# Patient Record
Sex: MALE | Race: WHITE | NOT HISPANIC OR LATINO | Employment: OTHER | URBAN - METROPOLITAN AREA
[De-identification: names, ages, dates, MRNs, and addresses within clinical notes are randomized per-mention and may not be internally consistent; named-entity substitution may affect disease eponyms.]

---

## 2023-02-09 PROBLEM — K21.9 GERD (GASTROESOPHAGEAL REFLUX DISEASE): Status: ACTIVE | Noted: 2023-02-09

## 2023-02-09 PROBLEM — M54.2 NECK PAIN: Status: ACTIVE | Noted: 2023-02-09

## 2023-02-09 PROBLEM — E83.10 DISORDER OF IRON METABOLISM: Status: ACTIVE | Noted: 2023-02-09

## 2023-02-09 PROBLEM — Z95.1 HISTORY OF CORONARY ARTERY BYPASS SURGERY: Status: ACTIVE | Noted: 2023-02-09

## 2023-02-09 PROBLEM — M48.07 LUMBOSACRAL SPINAL STENOSIS: Status: ACTIVE | Noted: 2023-02-09

## 2023-02-09 PROBLEM — R05.3 PERSISTENT COUGH: Status: ACTIVE | Noted: 2023-02-09

## 2023-02-09 PROBLEM — M51.369 LUMBAR DEGENERATIVE DISC DISEASE: Status: ACTIVE | Noted: 2023-02-09

## 2023-02-09 PROBLEM — F52.21 ERECTILE DISORDER, ACQUIRED, GENERALIZED, SEVERE: Status: ACTIVE | Noted: 2023-02-09

## 2023-02-09 PROBLEM — M65.30 TRIGGER FINGER: Status: ACTIVE | Noted: 2023-02-09

## 2023-02-09 PROBLEM — G25.81 RLS (RESTLESS LEGS SYNDROME): Status: ACTIVE | Noted: 2023-02-09

## 2023-02-09 PROBLEM — Z86.711 HISTORY OF PULMONARY EMBOLUS (PE): Status: ACTIVE | Noted: 2023-02-09

## 2023-02-09 PROBLEM — R20.2 PARESTHESIA OF ARM: Status: ACTIVE | Noted: 2023-02-09

## 2023-02-09 PROBLEM — E66.9 OBESITY (BMI 30-39.9): Status: ACTIVE | Noted: 2023-02-09

## 2023-02-09 PROBLEM — G47.36 SLEEP RELATED HYPOVENTILATION IN CONDITIONS CLASSIFIED ELSEWHERE: Status: ACTIVE | Noted: 2023-02-09

## 2023-02-09 PROBLEM — F32.0 MDD (MAJOR DEPRESSIVE DISORDER), SINGLE EPISODE, MILD (CMS-HCC): Status: ACTIVE | Noted: 2023-02-09

## 2023-02-09 PROBLEM — E78.5 HYPERLIPIDEMIA: Status: ACTIVE | Noted: 2023-02-09

## 2023-02-09 PROBLEM — M79.674 PAIN OF TOE OF RIGHT FOOT: Status: ACTIVE | Noted: 2023-02-09

## 2023-02-09 PROBLEM — M47.812 CERVICAL SPONDYLOSIS WITHOUT MYELOPATHY: Status: ACTIVE | Noted: 2023-02-09

## 2023-02-09 PROBLEM — I48.91 POSTOPERATIVE ATRIAL FIBRILLATION (MULTI): Status: ACTIVE | Noted: 2023-02-09

## 2023-02-09 PROBLEM — S76.319A: Status: ACTIVE | Noted: 2023-02-09

## 2023-02-09 PROBLEM — M54.17 LUMBOSACRAL NEURITIS: Status: ACTIVE | Noted: 2023-02-09

## 2023-02-09 PROBLEM — K43.9 VENTRAL HERNIA: Status: ACTIVE | Noted: 2023-02-09

## 2023-02-09 PROBLEM — U07.1 COVID-19: Status: ACTIVE | Noted: 2023-02-09

## 2023-02-09 PROBLEM — M54.50 LOW BACK PAIN: Status: ACTIVE | Noted: 2023-02-09

## 2023-02-09 PROBLEM — E04.1 THYROID NODULE: Status: ACTIVE | Noted: 2023-02-09

## 2023-02-09 PROBLEM — R07.9 EXERTIONAL CHEST PAIN: Status: ACTIVE | Noted: 2023-02-09

## 2023-02-09 PROBLEM — M51.36 LUMBAR DEGENERATIVE DISC DISEASE: Status: ACTIVE | Noted: 2023-02-09

## 2023-02-09 PROBLEM — M19.032: Status: ACTIVE | Noted: 2023-02-09

## 2023-02-09 PROBLEM — R21 RASH: Status: ACTIVE | Noted: 2023-02-09

## 2023-02-09 PROBLEM — M51.27 HERNIATED NUCLEUS PULPOSUS, L5-S1, RIGHT: Status: ACTIVE | Noted: 2023-02-09

## 2023-02-09 PROBLEM — M54.16 LEFT LUMBAR RADICULOPATHY: Status: ACTIVE | Noted: 2023-02-09

## 2023-02-09 PROBLEM — I26.99 PULMONARY EMBOLUS (MULTI): Status: ACTIVE | Noted: 2023-02-09

## 2023-02-09 PROBLEM — E55.9 VITAMIN D DEFICIENCY, UNSPECIFIED: Status: ACTIVE | Noted: 2023-02-09

## 2023-02-09 PROBLEM — J40 BRONCHITIS: Status: ACTIVE | Noted: 2023-02-09

## 2023-02-09 PROBLEM — C91.40 HAIRY CELL LEUKEMIA (MULTI): Status: ACTIVE | Noted: 2023-02-09

## 2023-02-09 PROBLEM — H10.029 PINK EYE: Status: ACTIVE | Noted: 2023-02-09

## 2023-02-09 PROBLEM — M20.5X1 CONTRACTURE OF TOE OF RIGHT FOOT: Status: ACTIVE | Noted: 2023-02-09

## 2023-02-09 PROBLEM — I10 ESSENTIAL HYPERTENSION: Status: ACTIVE | Noted: 2023-02-09

## 2023-02-09 PROBLEM — E04.1 UNINODULAR GOITER (NONTOXIC): Status: ACTIVE | Noted: 2023-02-09

## 2023-02-09 PROBLEM — I97.89 POSTOPERATIVE ATRIAL FIBRILLATION (MULTI): Status: ACTIVE | Noted: 2023-02-09

## 2023-02-09 PROBLEM — R19.5 LOOSE STOOLS: Status: ACTIVE | Noted: 2023-02-09

## 2023-02-09 PROBLEM — T16.9XXA FOREIGN BODY IN EAR: Status: ACTIVE | Noted: 2023-02-09

## 2023-02-09 PROBLEM — J02.9 SORE THROAT: Status: ACTIVE | Noted: 2023-02-09

## 2023-02-09 PROBLEM — H90.3 BILATERAL SENSORINEURAL HEARING LOSS: Status: ACTIVE | Noted: 2023-02-09

## 2023-02-09 PROBLEM — R06.09 EXERTIONAL DYSPNEA: Status: ACTIVE | Noted: 2023-02-09

## 2023-02-09 PROBLEM — A49.1 STREPTOCOCCOSIS: Status: ACTIVE | Noted: 2023-02-09

## 2023-02-09 PROBLEM — R53.83 FATIGUE: Status: ACTIVE | Noted: 2023-02-09

## 2023-02-09 PROBLEM — F32.A DEPRESSED: Status: ACTIVE | Noted: 2023-02-09

## 2023-02-09 PROBLEM — E66.811 CLASS 1 OBESITY WITH BODY MASS INDEX (BMI) OF 34.0 TO 34.9 IN ADULT: Status: ACTIVE | Noted: 2023-02-09

## 2023-02-09 PROBLEM — R23.8 SKIN IRRITATION: Status: ACTIVE | Noted: 2023-02-09

## 2023-02-09 PROBLEM — G56.00 CARPAL TUNNEL SYNDROME: Status: ACTIVE | Noted: 2023-02-09

## 2023-02-09 PROBLEM — R63.5 WEIGHT GAIN: Status: ACTIVE | Noted: 2023-02-09

## 2023-02-09 PROBLEM — G47.33 OBSTRUCTIVE SLEEP APNEA, ADULT: Status: ACTIVE | Noted: 2023-02-09

## 2023-02-09 PROBLEM — H65.111 ACUTE ALLERGIC OTITIS MEDIA OF RIGHT EAR: Status: ACTIVE | Noted: 2023-02-09

## 2023-02-09 PROBLEM — R91.1 LUNG NODULE: Status: ACTIVE | Noted: 2023-02-09

## 2023-02-09 PROBLEM — J90 PLEURAL EFFUSION: Status: ACTIVE | Noted: 2023-02-09

## 2023-02-09 PROBLEM — R60.0 BILATERAL LOWER EXTREMITY EDEMA: Status: ACTIVE | Noted: 2023-02-09

## 2023-03-23 ENCOUNTER — OFFICE VISIT (OUTPATIENT)
Dept: PRIMARY CARE | Facility: CLINIC | Age: 78
End: 2023-03-23
Payer: MEDICARE

## 2023-03-23 ENCOUNTER — HOSPITAL ENCOUNTER (OUTPATIENT)
Dept: DATA CONVERSION | Facility: HOSPITAL | Age: 78
End: 2023-03-23
Attending: ANESTHESIOLOGY | Admitting: ANESTHESIOLOGY

## 2023-03-23 ENCOUNTER — LAB (OUTPATIENT)
Dept: LAB | Facility: LAB | Age: 78
End: 2023-03-23
Payer: MEDICARE

## 2023-03-23 VITALS
SYSTOLIC BLOOD PRESSURE: 106 MMHG | BODY MASS INDEX: 33.95 KG/M2 | WEIGHT: 220 LBS | OXYGEN SATURATION: 93 % | DIASTOLIC BLOOD PRESSURE: 67 MMHG | HEART RATE: 82 BPM

## 2023-03-23 DIAGNOSIS — E78.5 HYPERLIPIDEMIA, UNSPECIFIED HYPERLIPIDEMIA TYPE: Primary | ICD-10-CM

## 2023-03-23 DIAGNOSIS — M51.16 INTERVERTEBRAL DISC DISORDERS WITH RADICULOPATHY, LUMBAR REGION: ICD-10-CM

## 2023-03-23 DIAGNOSIS — E78.5 HYPERLIPIDEMIA, UNSPECIFIED HYPERLIPIDEMIA TYPE: ICD-10-CM

## 2023-03-23 LAB
ALANINE AMINOTRANSFERASE (SGPT) (U/L) IN SER/PLAS: 26 U/L (ref 10–52)
ALBUMIN (G/DL) IN SER/PLAS: 4.4 G/DL (ref 3.4–5)
ALKALINE PHOSPHATASE (U/L) IN SER/PLAS: 72 U/L (ref 33–136)
ANION GAP IN SER/PLAS: 11 MMOL/L (ref 10–20)
ASPARTATE AMINOTRANSFERASE (SGOT) (U/L) IN SER/PLAS: 20 U/L (ref 9–39)
BILIRUBIN TOTAL (MG/DL) IN SER/PLAS: 0.7 MG/DL (ref 0–1.2)
CALCIUM (MG/DL) IN SER/PLAS: 9.1 MG/DL (ref 8.6–10.3)
CARBON DIOXIDE, TOTAL (MMOL/L) IN SER/PLAS: 28 MMOL/L (ref 21–32)
CHLORIDE (MMOL/L) IN SER/PLAS: 103 MMOL/L (ref 98–107)
CHOLESTEROL (MG/DL) IN SER/PLAS: 62 MG/DL (ref 0–199)
CHOLESTEROL IN HDL (MG/DL) IN SER/PLAS: 41.3 MG/DL
CHOLESTEROL/HDL RATIO: 1.5
CREATININE (MG/DL) IN SER/PLAS: 0.99 MG/DL (ref 0.5–1.3)
ERYTHROCYTE DISTRIBUTION WIDTH (RATIO) BY AUTOMATED COUNT: 13.6 % (ref 11.5–14.5)
ERYTHROCYTE MEAN CORPUSCULAR HEMOGLOBIN CONCENTRATION (G/DL) BY AUTOMATED: 32.9 G/DL (ref 32–36)
ERYTHROCYTE MEAN CORPUSCULAR VOLUME (FL) BY AUTOMATED COUNT: 102 FL (ref 80–100)
ERYTHROCYTES (10*6/UL) IN BLOOD BY AUTOMATED COUNT: 4.64 X10E12/L (ref 4.5–5.9)
GFR MALE: 78 ML/MIN/1.73M2
GLUCOSE (MG/DL) IN SER/PLAS: 102 MG/DL (ref 74–99)
HEMATOCRIT (%) IN BLOOD BY AUTOMATED COUNT: 47.1 % (ref 41–52)
HEMOGLOBIN (G/DL) IN BLOOD: 15.5 G/DL (ref 13.5–17.5)
LDL: 4 MG/DL (ref 0–99)
LEUKOCYTES (10*3/UL) IN BLOOD BY AUTOMATED COUNT: 5.4 X10E9/L (ref 4.4–11.3)
PLATELETS (10*3/UL) IN BLOOD AUTOMATED COUNT: 171 X10E9/L (ref 150–450)
POTASSIUM (MMOL/L) IN SER/PLAS: 4.5 MMOL/L (ref 3.5–5.3)
PROTEIN TOTAL: 6.6 G/DL (ref 6.4–8.2)
SODIUM (MMOL/L) IN SER/PLAS: 137 MMOL/L (ref 136–145)
TRIGLYCERIDE (MG/DL) IN SER/PLAS: 84 MG/DL (ref 0–149)
UREA NITROGEN (MG/DL) IN SER/PLAS: 28 MG/DL (ref 6–23)
VLDL: 17 MG/DL (ref 0–40)

## 2023-03-23 PROCEDURE — 80061 LIPID PANEL: CPT

## 2023-03-23 PROCEDURE — 3078F DIAST BP <80 MM HG: CPT | Performed by: INTERNAL MEDICINE

## 2023-03-23 PROCEDURE — 36415 COLL VENOUS BLD VENIPUNCTURE: CPT

## 2023-03-23 PROCEDURE — 1160F RVW MEDS BY RX/DR IN RCRD: CPT | Performed by: INTERNAL MEDICINE

## 2023-03-23 PROCEDURE — 1159F MED LIST DOCD IN RCRD: CPT | Performed by: INTERNAL MEDICINE

## 2023-03-23 PROCEDURE — 3074F SYST BP LT 130 MM HG: CPT | Performed by: INTERNAL MEDICINE

## 2023-03-23 PROCEDURE — 85027 COMPLETE CBC AUTOMATED: CPT

## 2023-03-23 PROCEDURE — 80053 COMPREHEN METABOLIC PANEL: CPT

## 2023-03-23 PROCEDURE — 99214 OFFICE O/P EST MOD 30 MIN: CPT | Performed by: INTERNAL MEDICINE

## 2023-03-23 ASSESSMENT — PATIENT HEALTH QUESTIONNAIRE - PHQ9
1. LITTLE INTEREST OR PLEASURE IN DOING THINGS: NOT AT ALL
2. FEELING DOWN, DEPRESSED OR HOPELESS: NOT AT ALL
SUM OF ALL RESPONSES TO PHQ9 QUESTIONS 1 AND 2: 0

## 2023-03-23 NOTE — PROGRESS NOTES
Subjective   Patient ID: Delano Aguilar is a 77 y.o. male who presents for Follow-up.    HPI   Overall doing well   No CP. Breathing is stable, no LE edema   Reports compliance with meds  Mood is stable       Review of Systems   All other systems reviewed and are negative.      Objective   /67   Pulse 82   Wt 99.8 kg (220 lb)   SpO2 93%   BMI 33.95 kg/m²     Physical Exam  Constitutional:       Appearance: Normal appearance.   HENT:      Head: Normocephalic and atraumatic.   Cardiovascular:      Rate and Rhythm: Normal rate and regular rhythm.      Heart sounds: Normal heart sounds. No murmur heard.     No gallop.   Pulmonary:      Effort: Pulmonary effort is normal. No respiratory distress.      Breath sounds: No wheezing or rales.   Abdominal:      General: Abdomen is flat. Bowel sounds are normal.      Palpations: Abdomen is soft.      Tenderness: There is no abdominal tenderness.   Skin:     General: Skin is warm and dry.      Findings: No erythema.   Neurological:      General: No focal deficit present.      Mental Status: He is alert and oriented to person, place, and time. Mental status is at baseline.   Psychiatric:         Mood and Affect: Mood normal.         Behavior: Behavior normal.         Assessment/Plan       #Depression   -Improved. Cont escitalopram 5mg daily and bupropion XL 300mg daily      #Lumbar radiculopathy, neck pain   -Follows with pain management.       #CAD s/p CABG, HLD  -CABG x3 11/2019 2/2 NSTEMI. Follows with cardiology. Continue ASA ,statin, Zetia, Praluent-per cardiology, goal <55     #HTN  -Continue losartan 50mg daily and metoprolol ER 25mg daily, goal <130/80     #TANO  -Reports compliance with CPAP     Colonoscopy: 3/2022  PSA: 6/15/20     RTC 6 mo

## 2023-03-23 NOTE — PATIENT INSTRUCTIONS
QUIT SMOKING CIGARS    GET FASTING BLOOD WORK-FAST FOR 10 HRS BLACK COFFEE AND WATER ARE OK     COME BACK IN 6 MONTHS

## 2023-04-19 ENCOUNTER — OFFICE VISIT (OUTPATIENT)
Dept: PRIMARY CARE | Facility: CLINIC | Age: 78
End: 2023-04-19
Payer: MEDICARE

## 2023-04-19 VITALS
OXYGEN SATURATION: 95 % | BODY MASS INDEX: 34.1 KG/M2 | DIASTOLIC BLOOD PRESSURE: 62 MMHG | HEART RATE: 81 BPM | SYSTOLIC BLOOD PRESSURE: 110 MMHG | WEIGHT: 221 LBS

## 2023-04-19 DIAGNOSIS — F32.0 MAJOR DEPRESSIVE DISORDER, SINGLE EPISODE, MILD (CMS-HCC): ICD-10-CM

## 2023-04-19 DIAGNOSIS — E66.09 CLASS 1 OBESITY DUE TO EXCESS CALORIES WITHOUT SERIOUS COMORBIDITY WITH BODY MASS INDEX (BMI) OF 34.0 TO 34.9 IN ADULT: Primary | ICD-10-CM

## 2023-04-19 PROCEDURE — 1160F RVW MEDS BY RX/DR IN RCRD: CPT | Performed by: INTERNAL MEDICINE

## 2023-04-19 PROCEDURE — 1159F MED LIST DOCD IN RCRD: CPT | Performed by: INTERNAL MEDICINE

## 2023-04-19 PROCEDURE — 3074F SYST BP LT 130 MM HG: CPT | Performed by: INTERNAL MEDICINE

## 2023-04-19 PROCEDURE — 99213 OFFICE O/P EST LOW 20 MIN: CPT | Performed by: INTERNAL MEDICINE

## 2023-04-19 PROCEDURE — 3078F DIAST BP <80 MM HG: CPT | Performed by: INTERNAL MEDICINE

## 2023-04-19 RX ORDER — METFORMIN HYDROCHLORIDE 500 MG/1
500 TABLET ORAL
Qty: 60 TABLET | Refills: 0 | Status: SHIPPED | OUTPATIENT
Start: 2023-04-19 | End: 2023-08-02 | Stop reason: ALTCHOICE

## 2023-04-19 RX ORDER — METFORMIN HYDROCHLORIDE 1000 MG/1
1000 TABLET ORAL
Qty: 180 TABLET | Refills: 1 | Status: SHIPPED | OUTPATIENT
Start: 2023-04-19 | End: 2023-08-02

## 2023-04-19 NOTE — PROGRESS NOTES
Subjective   Patient ID: Delano Aguilar is a 78 y.o. male who presents for Follow-up. Wants to start a new medication ozempic     HPI   Patient comes into today with questions regarding Ozempic. He states he tends to binge eat in the evenings. He does minimal exercise      Review of Systems   All other systems reviewed and are negative.      Objective   /62   Pulse 81   Wt 100 kg (221 lb)   SpO2 95%   BMI 34.10 kg/m²     Physical Exam  Constitutional:       Appearance: Normal appearance.   HENT:      Head: Normocephalic and atraumatic.   Cardiovascular:      Rate and Rhythm: Normal rate and regular rhythm.      Heart sounds: Normal heart sounds. No murmur heard.     No gallop.   Pulmonary:      Effort: Pulmonary effort is normal. No respiratory distress.      Breath sounds: No wheezing or rales.   Skin:     General: Skin is warm and dry.      Findings: No rash.   Neurological:      Mental Status: He is alert and oriented to person, place, and time. Mental status is at baseline.   Psychiatric:         Mood and Affect: Mood normal.         Behavior: Behavior normal.         Assessment/Plan       #Obesity   -discussed 2000cal/day  diet and 150 min CV exercise weekly   -Start metformin 500mg BID x 4 weeks then increase to 1g BID  -Refer to dietician

## 2023-04-20 RX ORDER — ESCITALOPRAM OXALATE 5 MG/1
TABLET ORAL
Qty: 90 TABLET | Refills: 1 | Status: SHIPPED | OUTPATIENT
Start: 2023-04-20 | End: 2023-08-02

## 2023-05-02 DIAGNOSIS — K21.9 GASTRO-ESOPHAGEAL REFLUX DISEASE WITHOUT ESOPHAGITIS: ICD-10-CM

## 2023-05-02 DIAGNOSIS — F32.0 MAJOR DEPRESSIVE DISORDER, SINGLE EPISODE, MILD (CMS-HCC): ICD-10-CM

## 2023-05-03 RX ORDER — PANTOPRAZOLE SODIUM 40 MG/1
TABLET, DELAYED RELEASE ORAL
Qty: 90 TABLET | Refills: 1 | Status: SHIPPED | OUTPATIENT
Start: 2023-05-03 | End: 2023-12-25

## 2023-05-03 RX ORDER — BUPROPION HYDROCHLORIDE 300 MG/1
TABLET ORAL
Qty: 90 TABLET | Refills: 1 | Status: SHIPPED | OUTPATIENT
Start: 2023-05-03 | End: 2023-12-21

## 2023-06-29 ENCOUNTER — HOSPITAL ENCOUNTER (OUTPATIENT)
Dept: DATA CONVERSION | Facility: HOSPITAL | Age: 78
End: 2023-06-30
Attending: NEUROLOGICAL SURGERY | Admitting: NEUROLOGICAL SURGERY
Payer: MEDICARE

## 2023-06-29 DIAGNOSIS — M47.27 OTHER SPONDYLOSIS WITH RADICULOPATHY, LUMBOSACRAL REGION: ICD-10-CM

## 2023-06-29 DIAGNOSIS — C91.41 HAIRY CELL LEUKEMIA, IN REMISSION (MULTI): ICD-10-CM

## 2023-06-29 DIAGNOSIS — M48.061 SPINAL STENOSIS, LUMBAR REGION WITHOUT NEUROGENIC CLAUDICATION: ICD-10-CM

## 2023-06-29 DIAGNOSIS — Z79.84 LONG TERM (CURRENT) USE OF ORAL HYPOGLYCEMIC DRUGS: ICD-10-CM

## 2023-06-29 DIAGNOSIS — K21.9 GASTRO-ESOPHAGEAL REFLUX DISEASE WITHOUT ESOPHAGITIS: ICD-10-CM

## 2023-06-29 DIAGNOSIS — E78.5 HYPERLIPIDEMIA, UNSPECIFIED: ICD-10-CM

## 2023-06-29 DIAGNOSIS — I10 ESSENTIAL (PRIMARY) HYPERTENSION: ICD-10-CM

## 2023-06-29 DIAGNOSIS — Z95.1 PRESENCE OF AORTOCORONARY BYPASS GRAFT: ICD-10-CM

## 2023-06-29 DIAGNOSIS — E66.9 OBESITY, UNSPECIFIED: ICD-10-CM

## 2023-06-29 DIAGNOSIS — Z86.711 PERSONAL HISTORY OF PULMONARY EMBOLISM: ICD-10-CM

## 2023-06-29 DIAGNOSIS — I25.2 OLD MYOCARDIAL INFARCTION: ICD-10-CM

## 2023-06-29 DIAGNOSIS — G47.33 OBSTRUCTIVE SLEEP APNEA (ADULT) (PEDIATRIC): ICD-10-CM

## 2023-06-29 DIAGNOSIS — Z79.82 LONG TERM (CURRENT) USE OF ASPIRIN: ICD-10-CM

## 2023-06-29 DIAGNOSIS — E11.9 TYPE 2 DIABETES MELLITUS WITHOUT COMPLICATIONS (MULTI): ICD-10-CM

## 2023-06-29 DIAGNOSIS — F17.200 NICOTINE DEPENDENCE, UNSPECIFIED, UNCOMPLICATED: ICD-10-CM

## 2023-06-29 DIAGNOSIS — I25.10 ATHEROSCLEROTIC HEART DISEASE OF NATIVE CORONARY ARTERY WITHOUT ANGINA PECTORIS: ICD-10-CM

## 2023-06-29 DIAGNOSIS — M51.16 INTERVERTEBRAL DISC DISORDERS WITH RADICULOPATHY, LUMBAR REGION: ICD-10-CM

## 2023-06-29 LAB
POCT GLUCOSE: 113 MG/DL (ref 74–99)
POCT GLUCOSE: 157 MG/DL (ref 74–99)

## 2023-06-30 LAB — POCT GLUCOSE: 127 MG/DL (ref 74–99)

## 2023-08-02 ENCOUNTER — OFFICE VISIT (OUTPATIENT)
Dept: PRIMARY CARE | Facility: CLINIC | Age: 78
End: 2023-08-02
Payer: MEDICARE

## 2023-08-02 VITALS
DIASTOLIC BLOOD PRESSURE: 79 MMHG | BODY MASS INDEX: 32.1 KG/M2 | SYSTOLIC BLOOD PRESSURE: 131 MMHG | OXYGEN SATURATION: 95 % | WEIGHT: 208 LBS | HEART RATE: 87 BPM

## 2023-08-02 DIAGNOSIS — G89.29 CHRONIC LEFT-SIDED LOW BACK PAIN WITH LEFT-SIDED SCIATICA: Primary | ICD-10-CM

## 2023-08-02 DIAGNOSIS — M54.42 CHRONIC LEFT-SIDED LOW BACK PAIN WITH LEFT-SIDED SCIATICA: Primary | ICD-10-CM

## 2023-08-02 PROCEDURE — 99214 OFFICE O/P EST MOD 30 MIN: CPT | Performed by: INTERNAL MEDICINE

## 2023-08-02 PROCEDURE — 1160F RVW MEDS BY RX/DR IN RCRD: CPT | Performed by: INTERNAL MEDICINE

## 2023-08-02 PROCEDURE — 3078F DIAST BP <80 MM HG: CPT | Performed by: INTERNAL MEDICINE

## 2023-08-02 PROCEDURE — 3075F SYST BP GE 130 - 139MM HG: CPT | Performed by: INTERNAL MEDICINE

## 2023-08-02 PROCEDURE — 1125F AMNT PAIN NOTED PAIN PRSNT: CPT | Performed by: INTERNAL MEDICINE

## 2023-08-02 PROCEDURE — 1159F MED LIST DOCD IN RCRD: CPT | Performed by: INTERNAL MEDICINE

## 2023-08-02 RX ORDER — GABAPENTIN 300 MG/1
300 CAPSULE ORAL NIGHTLY PRN
Qty: 30 CAPSULE | Refills: 0 | Status: SHIPPED | OUTPATIENT
Start: 2023-08-02 | End: 2023-09-25 | Stop reason: ALTCHOICE

## 2023-08-02 NOTE — PATIENT INSTRUCTIONS
You have an essential tremor. If it gets worse or is more frequent will have you follow up with  neuro.     Will go trial of gabapentin at bedtime as needed for pain, try to minimize ibuprofen and opiates if possible

## 2023-08-02 NOTE — PROGRESS NOTES
Subjective   Patient ID: Delano Aguilar is a 78 y.o. male who presents for Follow-up (SLEEPING TO MUCH ).    HPI   S/p Right L1-L2 laminectomy with Dr. Burnett 6/29/.23. Right buttock pain and right LE weakness and pain is nearly resolved.. Still has pain that goes from left buttocks to left knee.     Reports left hand tremor x 6 months. Tremor is worse when he tries to write (is left handed). No issues with speech. Recently had lumbar surgery and gait is improving . No issues with confusion     Review of Systems   All other systems reviewed and are negative.      Objective   Wt 94.3 kg (208 lb)   BMI 32.10 kg/m²     Physical Exam  Constitutional:       Appearance: Normal appearance.   HENT:      Head: Normocephalic and atraumatic.   Cardiovascular:      Rate and Rhythm: Normal rate and regular rhythm.      Heart sounds: Normal heart sounds. No murmur heard.     No gallop.   Pulmonary:      Effort: Pulmonary effort is normal. No respiratory distress.      Breath sounds: No wheezing or rales.   Skin:     General: Skin is warm and dry.      Findings: No rash.   Neurological:      Mental Status: He is alert and oriented to person, place, and time. Mental status is at baseline.   Psychiatric:         Mood and Affect: Mood normal.         Behavior: Behavior normal.         Assessment/Plan       #Tremor  -Likely essential tremor. Does not have any tremor today. If tremor worsens consider increasing beta-blocker and/or neuro referral    #Low back pain   -Trial of gabapentin 300mg at bedtime prn

## 2023-08-17 ENCOUNTER — HOSPITAL ENCOUNTER (OUTPATIENT)
Dept: DATA CONVERSION | Facility: HOSPITAL | Age: 78
End: 2023-08-17
Attending: ANESTHESIOLOGY | Admitting: ANESTHESIOLOGY
Payer: MEDICARE

## 2023-08-17 DIAGNOSIS — M46.1 SACROILIITIS, NOT ELSEWHERE CLASSIFIED (CMS-HCC): ICD-10-CM

## 2023-08-18 DIAGNOSIS — M51.27 OTHER INTERVERTEBRAL DISC DISPLACEMENT, LUMBOSACRAL REGION: ICD-10-CM

## 2023-08-21 RX ORDER — IBUPROFEN 600 MG/1
TABLET ORAL
Qty: 90 TABLET | Refills: 0 | Status: SHIPPED | OUTPATIENT
Start: 2023-08-21 | End: 2023-11-06 | Stop reason: WASHOUT

## 2023-08-21 NOTE — TELEPHONE ENCOUNTER
Requested Prescriptions     Pending Prescriptions Disp Refills    ibuprofen 600 mg tablet 90 tablet 0     Sig: TAKE 1 TABLET 3 TIMES DAILY WITH FOOD AS NEEDED.

## 2023-09-16 PROBLEM — I10 ESSENTIAL (PRIMARY) HYPERTENSION: Status: ACTIVE | Noted: 2019-10-26

## 2023-09-16 PROBLEM — E66.9 OBESITY: Status: ACTIVE | Noted: 2019-10-26

## 2023-09-16 PROBLEM — Z92.21 PERSONAL HISTORY OF CHEMOTHERAPY: Status: ACTIVE | Noted: 2019-10-26

## 2023-09-16 PROBLEM — M48.062 NEUROGENIC CLAUDICATION DUE TO LUMBAR SPINAL STENOSIS: Status: ACTIVE | Noted: 2023-09-16

## 2023-09-16 PROBLEM — Z79.02 LONG TERM (CURRENT) USE OF ANTITHROMBOTICS/ANTIPLATELETS: Status: ACTIVE | Noted: 2019-10-26

## 2023-09-16 PROBLEM — Z95.1 PRESENCE OF AORTOCORONARY BYPASS GRAFT: Status: ACTIVE | Noted: 2019-10-26

## 2023-09-16 PROBLEM — F17.200 NICOTINE DEPENDENCE, UNSPECIFIED, UNCOMPLICATED: Status: ACTIVE | Noted: 2019-10-26

## 2023-09-16 PROBLEM — I25.10 ATHSCL HEART DISEASE OF NATIVE CORONARY ARTERY W/O ANG PCTRS: Status: ACTIVE | Noted: 2019-10-26

## 2023-09-16 PROBLEM — I26.99 OTHER PULMONARY EMBOLISM WITHOUT ACUTE COR PULMONALE (MULTI): Status: ACTIVE | Noted: 2019-11-10

## 2023-09-16 PROBLEM — C91.41 HAIRY CELL LEUKEMIA, IN REMISSION (MULTI): Status: ACTIVE | Noted: 2019-10-26

## 2023-09-16 PROBLEM — M47.812 CERVICAL SPONDYLOSIS WITHOUT MYELOPATHY: Status: ACTIVE | Noted: 2023-09-16

## 2023-09-16 PROBLEM — M46.1 SACROILIITIS (CMS-HCC): Status: ACTIVE | Noted: 2023-09-16

## 2023-09-16 PROBLEM — F32.9 MAJOR DEPRESSIVE DISORDER, SINGLE EPISODE: Status: ACTIVE | Noted: 2019-10-26

## 2023-09-16 PROBLEM — I21.4 NON-ST ELEVATION (NSTEMI) MYOCARDIAL INFARCTION (MULTI): Status: ACTIVE | Noted: 2019-10-26

## 2023-09-16 PROBLEM — G47.33 OSA (OBSTRUCTIVE SLEEP APNEA): Status: ACTIVE | Noted: 2019-10-26

## 2023-09-16 RX ORDER — GLUCOSAMINE/CHONDR SU A SOD 167-133 MG
1 CAPSULE ORAL DAILY
COMMUNITY
End: 2023-10-18

## 2023-09-16 RX ORDER — ATORVASTATIN CALCIUM 40 MG/1
1 TABLET, FILM COATED ORAL DAILY
COMMUNITY
Start: 2019-11-15 | End: 2023-09-25 | Stop reason: ALTCHOICE

## 2023-09-16 RX ORDER — GABAPENTIN 600 MG/1
1 TABLET ORAL NIGHTLY
COMMUNITY
Start: 2023-08-08 | End: 2023-09-25 | Stop reason: ALTCHOICE

## 2023-09-16 RX ORDER — MULTIVIT WITH IRON,MINERALS
50 TABLET ORAL DAILY
COMMUNITY
Start: 2019-11-15 | End: 2023-10-18

## 2023-09-16 RX ORDER — OXYCODONE AND ACETAMINOPHEN 5; 325 MG/1; MG/1
1 TABLET ORAL EVERY 4 HOURS PRN
COMMUNITY
Start: 2023-06-28 | End: 2023-10-18 | Stop reason: ALTCHOICE

## 2023-09-16 RX ORDER — ATORVASTATIN CALCIUM 80 MG/1
80 TABLET, FILM COATED ORAL NIGHTLY
COMMUNITY
Start: 2022-10-26 | End: 2023-09-25 | Stop reason: ALTCHOICE

## 2023-09-16 RX ORDER — OXYCODONE AND ACETAMINOPHEN 5; 325 MG/1; MG/1
1 TABLET ORAL 2 TIMES DAILY PRN
COMMUNITY
Start: 2020-11-18 | End: 2023-10-18 | Stop reason: ALTCHOICE

## 2023-09-17 PROBLEM — M51.16 RADICULOPATHY DUE TO LUMBAR INTERVERTEBRAL DISC DISORDER: Status: ACTIVE | Noted: 2023-09-17

## 2023-09-24 NOTE — PROGRESS NOTES
Subjective   Patient ID: Delano Aguilar is a 78 y.o. male who presents for Follow-up.    HPI    S/p L1-L2 surgery with Dr. Burnett in July --right leg weakness has improved  Had recent SI injection, worked well for a few weeks, pain is starting to worsen again   Not using as much oxycodone, has only take 3-4 since his surgery   Erin 600mg nightly makes him feel tired in am     Review of Systems   All other systems reviewed and are negative.      Objective   There were no vitals taken for this visit.    Physical Exam  Constitutional:       Appearance: Normal appearance.   HENT:      Head: Normocephalic and atraumatic.   Cardiovascular:      Rate and Rhythm: Normal rate and regular rhythm.      Heart sounds: Normal heart sounds. No murmur heard.     No gallop.   Pulmonary:      Effort: Pulmonary effort is normal. No respiratory distress.      Breath sounds: No wheezing or rales.   Skin:     General: Skin is warm and dry.      Findings: No rash.   Neurological:      Mental Status: He is alert and oriented to person, place, and time. Mental status is at baseline.   Psychiatric:         Mood and Affect: Mood normal.         Behavior: Behavior normal.         Assessment/Plan       #Depression   -Stable. Cont escitalopram 5mg daily and bupropion XL 300mg daily     #Obesity  -Down 12 lbs since April. Cont metformin 1000mg BID  -Start topiramate, titrate to 50mg nightly      #Lumbar radiculopathy, neck pain   -s/p L1-L2 laminectomy. Follows with pain management   -Titrate gabapentin to[ 300mg TID       #CAD s/p CABG, HLD  -CABG x3 11/2019 2/2 NSTEMI. Follows with cardiology. Continue ASA ,statin, Zetia, Praluent-per cardiology, goal <55     #HTN  -Continue losartan 50mg daily and metoprolol ER 25mg daily, goal <130/80     #TANO  -Reports compliance with CPAP     Colonoscopy: 3/2022  PSA: 6/15/20     RTC 2 mo

## 2023-09-25 ENCOUNTER — OFFICE VISIT (OUTPATIENT)
Dept: PRIMARY CARE | Facility: CLINIC | Age: 78
End: 2023-09-25
Payer: MEDICARE

## 2023-09-25 VITALS
HEART RATE: 85 BPM | SYSTOLIC BLOOD PRESSURE: 117 MMHG | BODY MASS INDEX: 31.94 KG/M2 | WEIGHT: 207 LBS | DIASTOLIC BLOOD PRESSURE: 75 MMHG | OXYGEN SATURATION: 94 %

## 2023-09-25 DIAGNOSIS — M54.42 CHRONIC LEFT-SIDED LOW BACK PAIN WITH LEFT-SIDED SCIATICA: ICD-10-CM

## 2023-09-25 DIAGNOSIS — E66.09 CLASS 1 OBESITY DUE TO EXCESS CALORIES WITHOUT SERIOUS COMORBIDITY WITH BODY MASS INDEX (BMI) OF 34.0 TO 34.9 IN ADULT: Primary | ICD-10-CM

## 2023-09-25 DIAGNOSIS — R21 RASH: ICD-10-CM

## 2023-09-25 DIAGNOSIS — G89.29 CHRONIC LEFT-SIDED LOW BACK PAIN WITH LEFT-SIDED SCIATICA: ICD-10-CM

## 2023-09-25 PROCEDURE — 1125F AMNT PAIN NOTED PAIN PRSNT: CPT | Performed by: INTERNAL MEDICINE

## 2023-09-25 PROCEDURE — 1160F RVW MEDS BY RX/DR IN RCRD: CPT | Performed by: INTERNAL MEDICINE

## 2023-09-25 PROCEDURE — 3078F DIAST BP <80 MM HG: CPT | Performed by: INTERNAL MEDICINE

## 2023-09-25 PROCEDURE — 3074F SYST BP LT 130 MM HG: CPT | Performed by: INTERNAL MEDICINE

## 2023-09-25 PROCEDURE — 99214 OFFICE O/P EST MOD 30 MIN: CPT | Performed by: INTERNAL MEDICINE

## 2023-09-25 PROCEDURE — 1159F MED LIST DOCD IN RCRD: CPT | Performed by: INTERNAL MEDICINE

## 2023-09-25 RX ORDER — KETOCONAZOLE 20 MG/G
CREAM TOPICAL DAILY
Qty: 60 G | Refills: 0 | Status: SHIPPED | OUTPATIENT
Start: 2023-09-25

## 2023-09-25 RX ORDER — GABAPENTIN 300 MG/1
300 CAPSULE ORAL 3 TIMES DAILY
Qty: 270 CAPSULE | Refills: 0 | Status: SHIPPED | OUTPATIENT
Start: 2023-09-25 | End: 2023-10-02

## 2023-09-25 RX ORDER — TOPIRAMATE 50 MG/1
50 TABLET, FILM COATED ORAL 2 TIMES DAILY
Qty: 30 TABLET | Refills: 2 | Status: SHIPPED | OUTPATIENT
Start: 2023-09-25 | End: 2023-11-13 | Stop reason: SDUPTHER

## 2023-09-25 NOTE — PATIENT INSTRUCTIONS
Start topiramate 1/2 tab at bedime for 2 weeks then increase to 1 full, cont other meds as is ---take at bedime   Increase gabapentin 300mg to twice daily (am and pm) and if you tolerate it can increase to three times daily     See cardiology for lipids     Come back in 8 weeks

## 2023-09-29 VITALS — WEIGHT: 213.85 LBS | HEIGHT: 67 IN | BODY MASS INDEX: 33.56 KG/M2

## 2023-09-30 DIAGNOSIS — G89.29 CHRONIC LEFT-SIDED LOW BACK PAIN WITH LEFT-SIDED SCIATICA: ICD-10-CM

## 2023-09-30 DIAGNOSIS — M54.42 CHRONIC LEFT-SIDED LOW BACK PAIN WITH LEFT-SIDED SCIATICA: ICD-10-CM

## 2023-09-30 NOTE — H&P
History & Physical Reviewed:   I have reviewed the History and Physical dated:  30-May-2023   History and Physical reviewed and relevant findings noted. Patient examined to review pertinent physical  findings.: No significant changes   Home Medications Reviewed: no changes noted   Allergies Reviewed: no changes noted       ERAS (Enhanced Recovery After Surgery):  ·  ERAS Patient: yes   ·  CPM/PAT Utilization: yes   ·  Immunonutrition Recovery Drink Utilization: yes   ·  Carbohydrate Supplement Drink Utilization: yes     Consent:   COVID-19 Consent:  ·  COVID-19 Risk Consent Surgeon has reviewed key risks related to the risk of shanelle COVID-19 and if they contract COVID-19 what the risks are.       Electronic Signatures:  Inessa Mckay (PA (PHYSICIAN))  (Signed 29-Jun-2023 11:58)   Authored: History & Physical Reviewed, ERAS, Consent,  Note Completion      Last Updated: 29-Jun-2023 11:58 by Inessa Mckay (PA (PHYSICIAN))

## 2023-10-01 NOTE — OP NOTE
Post Operative Note:     PreOp Diagnosis: Sacroiliitis   Post-Procedure Diagnosis: Sacroiliitis   Procedure: 1.  Left sacroiliac joint injection  2.   3.   4.   5.   Surgeon: Yonatan Frederick   Resident/Fellow/Other Assistant: Vitaly White   Estimated Blood Loss (mL): none   Specimen: no   Findings: n/A     Operative Report Dictated:  Dictation: not applicable - note contains Operative  Report   Operative Report:    Following informed consent, the patient was operating room and placed in the prone position. The low back area was prepped and draped with chlorhexidine in sterile  fashion.  Using fluoroscopic guidance, the skin and subcutaneous tissue overlying needle trajectory to the lower aspect of the sacroiliac joint was anesthetized with 3 cc of 0.5% Lidocaine and bicarb.  A 25-gauge spinal needle was then advanced under  fluoroscopic guidance the lower aspect of the sacroiliac joint. Injection of a small amount of contrast with appropriate intra-articular/periarticular spread.  Thereafter the below medications were injected and the needle was removed.  The patient was  then transferred to the recovery room in stable condition.     The patient is to continue with physical therapy/home exercises and update us on response/progress.     Laterality: Left  Medication(s):  2mL, 0.5% lidocaine with 40 mg methylprednisolone     Attestation:   Note Completion:  I am a: Resident/Fellow   Attending Attestation I was present for the entire procedure         Electronic Signatures:  Yonatan Frederick)  (Signed 17-Aug-2023 13:59)   Co-Signer: Post Operative Note, Note Completion  Vitaly White (Fellow))  (Signed 17-Aug-2023 12:26)   Authored: Post Operative Note, Note Completion      Last Updated: 17-Aug-2023 13:59 by Yonatan Frederick)

## 2023-10-02 RX ORDER — GABAPENTIN 300 MG/1
300 CAPSULE ORAL 3 TIMES DAILY
Qty: 270 CAPSULE | Refills: 0 | Status: SHIPPED | OUTPATIENT
Start: 2023-10-02 | End: 2023-12-25

## 2023-10-02 NOTE — OP NOTE
PROCEDURE DETAILS      Postoperative Diagnosis:  lumbar spinal stenosis with radiculopathy   Surgeon: Sourav Burnett  Resident/Fellow/Other Assistant: Juan Masters    Procedure:  1. RIGHT L1-L2 LUMBAR LAMINECTOMY, PARTIAL MEDIAL FACECTOMY / FORAMINOTOMY,  MICRODISCECTOMY- OPEN    Anesthesia: Nnamdi Rivers  Estimated Blood Loss: 30 ml  Findings:   see op note  Specimens(s) Collected: no,     Drains and/or Catheters: 10 round subfascial NILS         Operative Report:   After informed consent was signed, the  patient was brought back to the operating room and general anesthesia induced by the anesthesia team.  The patient was then flipped prone onto a Saulo table with Salvatore frame.  Using intraoperative fluoroscopy a midline incision was planned and marked  centered over the L1-2 disc space.  This area was prepped and draped in the usual sterile fashion.  Incision was made with 15 blade.  Subdermal dissection was made with monopolar cautery.  A submuscular dissection was made over the spinous process and  lamina of L1 and top of L2.  This area was confirmed with intraoperative fluoroscopy again.  Next a L1 and top of L2 laminectomy was performed with a combination of Leksell rongeur, high-speed matchstick drill, curettes, and Kerrison rongeurs.  There  was excellent decompression of the spinal canal at this time.  The operative microscope was then brought in.  Next on the right side, a medial facetectomy was performed with a high-speed matchstick drill and Kerrison rongeurs.  There was additional thickened  ligamentum flavum in the lateral recess and medial foramina that was removed.  Then as the shoulder of the right L2 nerve root and thecal sac was retracted medially, a microdiscectomy was done at the L1 to disc space on the right side.  The capsule was  cut with an 11 blade.  Angled micro curettes and pituitary rongeurs were used to remove some disc fragments of what was a mostly calcified disc  osteophyte complex.  We had excellent decompression of the foramina at this time on the right side at this  level.  Hemostasis was achieved with Floseal, bipolar cautery, and bone wax.  The wound was copiously irrigated with antibiotic saline.  A 10 round subfascial drain was placed and secured to skin with a 2-0 Prolene suture.  The wound was closed in layers  with interrupted 0 Vicryl and 2-0 Vicryl sutures.  Skin was closed with a running 4-0 Monocryl stitch.  The wound was washed, dried and Exofin fusion dressing was placed.  After this was dried, the patient was flipped supine onto his bed and handed over  the anesthesia team.  He was extubated and taken to the recovery room in stable condition.                          Attestation:   Note Completion:  Attending Attestation I performed the procedure without a resident         Electronic Signatures:  Sourav Burnett)  (Signed 29-Jun-2023 23:21)   Authored: Post-Operative Note, Chart Review, Note Completion      Last Updated: 29-Jun-2023 23:21 by Sourav Burnett)

## 2023-10-18 ENCOUNTER — OFFICE VISIT (OUTPATIENT)
Dept: CARDIOLOGY | Facility: HOSPITAL | Age: 78
End: 2023-10-18
Payer: MEDICARE

## 2023-10-18 VITALS
WEIGHT: 209 LBS | BODY MASS INDEX: 32.76 KG/M2 | SYSTOLIC BLOOD PRESSURE: 121 MMHG | HEART RATE: 90 BPM | DIASTOLIC BLOOD PRESSURE: 76 MMHG

## 2023-10-18 DIAGNOSIS — R06.09 EXERTIONAL DYSPNEA: ICD-10-CM

## 2023-10-18 DIAGNOSIS — I25.10 ATHSCL HEART DISEASE OF NATIVE CORONARY ARTERY W/O ANG PCTRS: ICD-10-CM

## 2023-10-18 DIAGNOSIS — I10 ESSENTIAL HYPERTENSION: ICD-10-CM

## 2023-10-18 DIAGNOSIS — R06.02 SHORTNESS OF BREATH: Primary | ICD-10-CM

## 2023-10-18 PROCEDURE — 3074F SYST BP LT 130 MM HG: CPT | Performed by: INTERNAL MEDICINE

## 2023-10-18 PROCEDURE — 1125F AMNT PAIN NOTED PAIN PRSNT: CPT | Performed by: INTERNAL MEDICINE

## 2023-10-18 PROCEDURE — 99213 OFFICE O/P EST LOW 20 MIN: CPT | Performed by: INTERNAL MEDICINE

## 2023-10-18 PROCEDURE — 1159F MED LIST DOCD IN RCRD: CPT | Performed by: INTERNAL MEDICINE

## 2023-10-18 PROCEDURE — 1160F RVW MEDS BY RX/DR IN RCRD: CPT | Performed by: INTERNAL MEDICINE

## 2023-10-18 PROCEDURE — 3078F DIAST BP <80 MM HG: CPT | Performed by: INTERNAL MEDICINE

## 2023-10-18 ASSESSMENT — ENCOUNTER SYMPTOMS
OCCASIONAL FEELINGS OF UNSTEADINESS: 0
LOSS OF SENSATION IN FEET: 0
DEPRESSION: 0

## 2023-10-18 NOTE — PROGRESS NOTES
Chief Complaint:   Hyperlipidemia, Hypertension, Shortness of Breath, and Post-op CABG (R/t prior Nstemi)     History Of Present Illness:    Delano Aguilar is a 78 y.o. male here for followup. Hospitalized late 10/2019 with a chest pain syndrome and diagnosed with an NSTEMI with a peak troponin of 1.39 and no WMA on echocardiogram. Coronary angiography revealed multivessel disease. He is now s/p 3vCABG (LIMA-LAD, f-MALA to OM (T graft from LIMA), and left RA to the PDA). Had some post operative atrial fibrillation. He represented a few days after discharge and was diagnosed with a pulmonary embolism which was treated with AC. Had been dealing with post op pleural effusions and dyspnea which resolved with diuretics     He reports doing relatively well from a cardiovascular standpoint. Admits to ongoing occasional dyspnea that is seemingly stable. Gets exercise through walking and has been able to do more as of late due to a recent surgery which help alleviate some left leg discomfort / sciatica (which has now seemingly moved to his right leg over the last two weeks). Otherwise offers no complaints         Last Recorded Vitals:  Vitals:    10/18/23 1145   BP: 121/76   BP Location: Left arm   Patient Position: Sitting   Pulse: 90   Weight: 94.8 kg (209 lb)             Allergies:  Ampicillin    Outpatient Medications:  Current Outpatient Medications   Medication Instructions    alirocumab 150 mg/mL pen injector INJECT 150 MG (1 PEN) BENEATH THE SKIN ONCE EVERY TWO WEEKS.    aspirin 81 mg EC tablet 1 tablet, oral, Daily    buPROPion XL (Wellbutrin XL) 300 mg 24 hr tablet TAKE 1 TABLET BY MOUTH EVERY DAY    escitalopram (Lexapro) 5 mg tablet TAKE 1 TABLET DAILY.    gabapentin (NEURONTIN) 300 mg, oral, 3 times daily    ibuprofen 600 mg tablet TAKE 1 TABLET 3 TIMES DAILY WITH FOOD AS NEEDED.    ketoconazole (NIZOral) 2 % cream Topical, Daily, Apply a thin a layer to affected areas twice daily    losartan (Cozaar) 50 mg  tablet 1 tablet, oral, Daily    metFORMIN (GLUCOPHAGE) 1,000 mg, oral, 2 times daily with meals    metoprolol succinate XL (Toprol-XL) 25 mg 24 hr tablet 1 tablet, oral, Daily    naloxone (Narcan) 4 mg/0.1 mL nasal spray Use as directed for opioid overdose    pantoprazole (ProtoNix) 40 mg EC tablet TAKE ONE TABLET BY MOUTH DAILY    topiramate (TOPAMAX) 50 mg, oral, 2 times daily    triamcinolone (Kenalog) 0.1 % cream Apply and rub in a thin film to affected areas twice daily (AM and PM)         Physical Exam:  Gen Well appearing septuagenarian male sitting up in NAD. Body mass index is 32.76 kg/m².   CV rrr. No m/r/g appreciated. No JVD or leg edema. Pulses 2+ and symmetric.    Pulm Lungs clear with normal respiratory effort.  Neuro Alert and conversant. Grossly nonfocal.        I reviewed the patient's ECG -  NSR. Poor R wave progression.     I reviewed most recent imaging / labs / and office notes as well as details of any recent hospitalizations or ED visits.    Assessment/Plan   1. CAD with NSTEMI s/p CABG  On statin and aspirin. On PCSK9-i as well. Completed 3 years beta-blockade--will continue for now     2. Hypertension   BP quite reasonable after weight loss. His present regimen is to continue    3. Hyperlipidemia   Last LDL well below goal. He is to continue his present regimen.    4. Shortness of breath   Chronic. Negative prior workup and seemingly stable. Monitoring.       Followup 6 mos        Navin Hinds MD

## 2023-10-19 ENCOUNTER — SPECIALTY PHARMACY (OUTPATIENT)
Dept: PHARMACY | Facility: CLINIC | Age: 78
End: 2023-10-19

## 2023-10-19 DIAGNOSIS — E78.5 HYPERLIPIDEMIA LDL GOAL <70: Primary | ICD-10-CM

## 2023-10-19 DIAGNOSIS — I25.10 ASCVD (ARTERIOSCLEROTIC CARDIOVASCULAR DISEASE): ICD-10-CM

## 2023-10-20 ENCOUNTER — TELEPHONE (OUTPATIENT)
Dept: NEUROSURGERY | Facility: HOSPITAL | Age: 78
End: 2023-10-20
Payer: MEDICARE

## 2023-10-20 NOTE — TELEPHONE ENCOUNTER
Returned Mr. Aguilar call about the return of the sciatic pain he was having previously that was treated by  with steroid SI join injection. He notes that it is very painful and hard to get sleep. He was able to schedule with Dr. Frederick to follow up for a possible injection. He wanted to update the office and will call back with any further updates, question, or concerns.

## 2023-10-24 RX ORDER — ALIROCUMAB 150 MG/ML
INJECTION, SOLUTION SUBCUTANEOUS
Qty: 6 ML | Refills: 3 | Status: SHIPPED | OUTPATIENT
Start: 2023-10-24 | End: 2024-10-22

## 2023-10-30 ENCOUNTER — HOSPITAL ENCOUNTER (OUTPATIENT)
Dept: CARDIOLOGY | Facility: HOSPITAL | Age: 78
Discharge: HOME | End: 2023-10-30
Payer: MEDICARE

## 2023-10-30 PROCEDURE — 93005 ELECTROCARDIOGRAM TRACING: CPT

## 2023-10-30 PROCEDURE — 93010 ELECTROCARDIOGRAM REPORT: CPT | Performed by: INTERNAL MEDICINE

## 2023-10-31 PROCEDURE — RXMED WILLOW AMBULATORY MEDICATION CHARGE

## 2023-11-03 NOTE — PROGRESS NOTES
Injection Request/Flare Up Form    Where is your Pain: Right Leg  What does your pain feel like (sharp, dull, knife-like, burning, shooting, achy, stabbing, etc): aching  What makes your pain worse (walking, sitting, standing, bending, twisting, coughing, sneezing): lifting,bending  What makes your pain better (heat, ice, sitting, standing, laying down, medication, etc):  What number is your pain right now (0-10):8  Do any of the following apply:  Falls:no  Loss of bowel control:no  Loss of bladder control:no  Dropping things:no  Weakness in arms or legs: no  Persistent numbness:no    When did your flare up begin: 1 month ago  History of back/neck surgery: Right sided L1-L2 06/29/2023 Dr. Burnett  History of back/neck injections: Left SI 08/17/2023  Imaging: lumbar MRI and xray 2023  Medications: Methylprednisolone 5/2023, Percocet 5 BID, Gabapentin 600 mg qhs

## 2023-11-06 ENCOUNTER — OFFICE VISIT (OUTPATIENT)
Dept: PAIN MEDICINE | Facility: CLINIC | Age: 78
End: 2023-11-06
Payer: MEDICARE

## 2023-11-06 VITALS — DIASTOLIC BLOOD PRESSURE: 75 MMHG | RESPIRATION RATE: 20 BRPM | SYSTOLIC BLOOD PRESSURE: 147 MMHG | HEART RATE: 89 BPM

## 2023-11-06 DIAGNOSIS — M46.1 INFLAMMATION OF SACROILIAC JOINT (CMS-HCC): ICD-10-CM

## 2023-11-06 DIAGNOSIS — M96.1 POSTLAMINECTOMY SYNDROME, LUMBAR REGION: ICD-10-CM

## 2023-11-06 PROCEDURE — 3078F DIAST BP <80 MM HG: CPT | Performed by: ANESTHESIOLOGY

## 2023-11-06 PROCEDURE — 1125F AMNT PAIN NOTED PAIN PRSNT: CPT | Performed by: ANESTHESIOLOGY

## 2023-11-06 PROCEDURE — 1159F MED LIST DOCD IN RCRD: CPT | Performed by: ANESTHESIOLOGY

## 2023-11-06 PROCEDURE — 99213 OFFICE O/P EST LOW 20 MIN: CPT | Performed by: ANESTHESIOLOGY

## 2023-11-06 PROCEDURE — 1160F RVW MEDS BY RX/DR IN RCRD: CPT | Performed by: ANESTHESIOLOGY

## 2023-11-06 PROCEDURE — 3077F SYST BP >= 140 MM HG: CPT | Performed by: ANESTHESIOLOGY

## 2023-11-06 RX ORDER — OXYCODONE AND ACETAMINOPHEN 5; 325 MG/1; MG/1
1 TABLET ORAL 3 TIMES DAILY PRN
Qty: 84 TABLET | Refills: 0 | Status: SHIPPED | OUTPATIENT
Start: 2023-11-06 | End: 2023-12-11 | Stop reason: SDUPTHER

## 2023-11-06 ASSESSMENT — ENCOUNTER SYMPTOMS
LOSS OF SENSATION IN FEET: 0
OCCASIONAL FEELINGS OF UNSTEADINESS: 0
DEPRESSION: 0

## 2023-11-06 ASSESSMENT — PAIN SCALES - GENERAL: PAINLEVEL_OUTOF10: 8

## 2023-11-06 ASSESSMENT — PAIN DESCRIPTION - DESCRIPTORS: DESCRIPTORS: ACHING

## 2023-11-06 ASSESSMENT — PAIN - FUNCTIONAL ASSESSMENT: PAIN_FUNCTIONAL_ASSESSMENT: 0-10

## 2023-11-07 ENCOUNTER — PHARMACY VISIT (OUTPATIENT)
Dept: PHARMACY | Facility: CLINIC | Age: 78
End: 2023-11-07
Payer: MEDICARE

## 2023-11-07 NOTE — PROGRESS NOTES
Patient ID: Delano Aguilar is a 78 y.o. male.    Epidural Steroid Injection    Date/Time: 11/7/2023 9:33 AM    Performed by: Yonatan Frederick MD  Authorized by: Yonatan Frederick MD    Consent:     Consent obtained:  Written    Consent given by:  Patient    Risks, benefits, and alternatives were discussed: yes      Risks discussed:  Bleeding, infection, pain and nerve damage  Universal protocol:     Procedure explained and questions answered to patient or proxy's satisfaction: yes      Relevant documents present and verified: yes      Test results available: yes      Imaging studies available: yes      Required blood products, implants, devices, and special equipment available: yes      Site/side marked: yes      Immediately prior to procedure, a time out was called: yes      Patient identity confirmed:  Verbally with patient and arm band  Indications:     Indications:  BACK PAIN,BUTTOCK PAIN  Pre-procedure details:     Skin preparation:  Chlorhexidine    Preparation: Patient was prepped and draped in the usual sterile fashion    Sedation:     Sedation type:  None  Anesthesia:     Anesthesia method:  Local infiltration (LIDOCAINE 0.5%)  Procedure specific details:      History reviewed patient interviewed and the examined.  Risks and benefits from procedure discussed patient agreed to proceed and consent was signed.  Timeout was done in the operating room.  With the the patient in the prone position the right  lower back was prepped and draped in a sterile fashion.  Fluoroscopic guidance in an AP view the distal end of the sacroiliac joint was identified.  An ipsilateral oblique view was used as a trajectory view.  Lidocaine 0.5% was used for skin infiltration.  A 25-gauge 3.5 inch spinal needle was introduced into the medial border of the sacroiliac joints.  The needle was positioned to slip into the right intra-articular space.  Contrast showed adequate intraarticular spread without any vascular uptake.   Mixture of 0.5% ropivacaine mixed with 40 mg of Depo-Medrol was used and 2 cc was injected.  Needles were removed dressing applied patient tolerated the procedure without any complication and was transferred to the recovery room in stable condition.   Post-procedure details:     Procedure completion:  Tolerated

## 2023-11-09 ENCOUNTER — HOSPITAL ENCOUNTER (OUTPATIENT)
Dept: RADIOLOGY | Facility: HOSPITAL | Age: 78
Discharge: HOME | End: 2023-11-09
Payer: MEDICARE

## 2023-11-09 ENCOUNTER — OFFICE VISIT (OUTPATIENT)
Dept: PAIN MEDICINE | Facility: CLINIC | Age: 78
End: 2023-11-09
Payer: MEDICARE

## 2023-11-09 VITALS — HEART RATE: 73 BPM | SYSTOLIC BLOOD PRESSURE: 133 MMHG | RESPIRATION RATE: 16 BRPM | DIASTOLIC BLOOD PRESSURE: 79 MMHG

## 2023-11-09 DIAGNOSIS — M46.1 INFLAMMATION OF SACROILIAC JOINT (CMS-HCC): ICD-10-CM

## 2023-11-09 DIAGNOSIS — M46.1 SACROILIITIS (CMS-HCC): ICD-10-CM

## 2023-11-09 DIAGNOSIS — M46.1 SACROILIITIS (CMS-HCC): Primary | ICD-10-CM

## 2023-11-09 PROCEDURE — 3074F SYST BP LT 130 MM HG: CPT | Performed by: ANESTHESIOLOGY

## 2023-11-09 PROCEDURE — 2500000004 HC RX 250 GENERAL PHARMACY W/ HCPCS (ALT 636 FOR OP/ED)

## 2023-11-09 PROCEDURE — 2500000005 HC RX 250 GENERAL PHARMACY W/O HCPCS

## 2023-11-09 PROCEDURE — 1160F RVW MEDS BY RX/DR IN RCRD: CPT | Performed by: ANESTHESIOLOGY

## 2023-11-09 PROCEDURE — 3078F DIAST BP <80 MM HG: CPT | Performed by: ANESTHESIOLOGY

## 2023-11-09 PROCEDURE — 1125F AMNT PAIN NOTED PAIN PRSNT: CPT | Performed by: ANESTHESIOLOGY

## 2023-11-09 PROCEDURE — 27096 INJECT SACROILIAC JOINT: CPT | Performed by: ANESTHESIOLOGY

## 2023-11-09 PROCEDURE — 1159F MED LIST DOCD IN RCRD: CPT | Performed by: ANESTHESIOLOGY

## 2023-11-09 PROCEDURE — 77003 FLUOROGUIDE FOR SPINE INJECT: CPT

## 2023-11-09 PROCEDURE — 2550000001 HC RX 255 CONTRASTS: Performed by: ANESTHESIOLOGY

## 2023-11-09 RX ADMIN — IOHEXOL 5 ML: 240 INJECTION, SOLUTION INTRATHECAL; INTRAVASCULAR; INTRAVENOUS; ORAL at 12:46

## 2023-11-09 ASSESSMENT — PAIN SCALES - GENERAL
PAINLEVEL_OUTOF10: 1
PAINLEVEL_OUTOF10: 5 - MODERATE PAIN

## 2023-11-09 ASSESSMENT — ENCOUNTER SYMPTOMS
OCCASIONAL FEELINGS OF UNSTEADINESS: 0
DEPRESSION: 0
LOSS OF SENSATION IN FEET: 0

## 2023-11-09 ASSESSMENT — PAIN DESCRIPTION - DESCRIPTORS: DESCRIPTORS: ACHING

## 2023-11-09 ASSESSMENT — PAIN - FUNCTIONAL ASSESSMENT: PAIN_FUNCTIONAL_ASSESSMENT: 0-10

## 2023-11-09 NOTE — H&P
History Of Present Illness  Delano Aguilar is a 78 y.o. male presents for procedure state below. Endorses no changes in past medical history or medical health since last seen in clinic.     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    Surgical History  He has a past surgical history that includes Other surgical history (09/10/2021); Other surgical history (09/10/2021); Other surgical history (12/29/2019); US guided thyroid biopsy (8/18/2014); Tonsillectomy (01/22/2015); and Sinus surgery (01/22/2015).     Social History  He reports that he has been smoking cigars. He has never used smokeless tobacco. No history on file for alcohol use and drug use.    Family History  Family History   Problem Relation Name Age of Onset    Alzheimer's disease Mother      Blindness Mother          Legally ([place] Definition)    Colon cancer Father      Heart failure Father      Fibromyalgia Sister          Allergies  Ampicillin    Review of Symptoms:   Constitutional: Negative for chills, diaphoresis or fever  HENT: Negative for neck swelling  Eyes:.  Negative for eye pain  Respiratory:.  Negative for cough, shortness of breath or wheezing    Cardiovascular:.  Negative for chest pain or palpitations  Gastrointestinal:.  Negative for abdominal pain, nausea and vomiting  Genitourinary:.  Negative for urgency  Musculoskeletal:  Positive for back pain. Positive for joint pain. Denies falls within the past 3 months.  Skin: Negative for wounds or itching   Neurological: Negative for dizziness, seizures, loss of consciousness and  weakness  Endo/Heme/Allergies: Does not bruise/bleed easily  Psychiatric/Behavioral: Negative for depression. The patient does not appear anxious.       PHYSICAL EXAM  Vitals signs reviewed  Constitutional:       General: Not in acute distress     Appearance: Normal appearance. Not ill-appearing.  HENT:     Head: Normocephalic and atraumatic  Eyes:     Conjunctiva/sclera: Conjunctivae normal  Cardiovascular:     Rate and Rhythm: Normal rate and regular rhythm  Pulmonary:     Effort: No respiratory distress  Abdominal:     Palpations: Abdomen is soft  Musculoskeletal: PECK  Skin:     General: Skin is warm and dry  Neurological:     General: No focal deficit present  Psychiatric:         Mood and Affect: Mood normal         Behavior: Behavior normal     Last Recorded Vitals  /76   Pulse 79   Resp 20     Relevant Results  Current Outpatient Medications   Medication Instructions    alirocumab (Praluent Pen) 150 mg/mL pen injector INJECT 150 MG (1 PEN) BENEATH THE SKIN ONCE EVERY TWO WEEKS.    aspirin 81 mg EC tablet 1 tablet, oral, Daily    buPROPion XL (Wellbutrin XL) 300 mg 24 hr tablet TAKE 1 TABLET BY MOUTH EVERY DAY    escitalopram (Lexapro) 5 mg tablet TAKE 1 TABLET DAILY.    gabapentin (NEURONTIN) 300 mg, oral, 3 times daily    ketoconazole (NIZOral) 2 % cream Topical, Daily, Apply a thin a layer to affected areas twice daily    losartan (Cozaar) 50 mg tablet 1 tablet, oral, Daily    metFORMIN (GLUCOPHAGE) 1,000 mg, oral, 2 times daily with meals    metoprolol succinate XL (TOPROL-XL) 25 mg, oral, Daily    naloxone (Narcan) 4 mg/0.1 mL nasal spray Use as directed for opioid overdose    oxyCODONE-acetaminophen (Percocet) 5-325 mg tablet 1 tablet, oral, 3 times daily PRN    pantoprazole (ProtoNix) 40 mg EC tablet TAKE ONE TABLET BY MOUTH DAILY    topiramate (TOPAMAX) 50 mg, oral, 2 times daily    triamcinolone (Kenalog) 0.1 % cream Apply and rub in a thin film to affected areas twice daily (AM and PM)          MR lumbar spine wo IV contrast 05/17/2023    Narrative  Interpreted By:  SAMANTHA COHEN MD  MRN: [id number]  Patient Name: ZENON GOMEZ    STUDY:  MRI L-SPINE WO    INDICATION:  back pain  M54.17: Lumbosacral neuritis M51.36: Lumbar degenerative  disc disease M48.061: Lumbar stenosis    COMPARISON:  Lumbar spine MRI 07/24/2020.    ACCESSION NUMBER(S):  [id number]    ORDERING CLINICIAN:  RAMO MANUEL    TECHNIQUE:  Multiplanar multisequence MRI of the lumbar spine was performed  without the administration of intravenous contrast, according to  standard protocol.    FINDINGS:  ALIGNMENT: Straightening of usual lumbar lordosis. Slight  retrolisthesis of L2 on L3. 3 mm grade 1 anterolisthesis of L5 on S1.    VERTEBRAE: No acute fracture or aggressive osseous lesion. Vertebral  body heights are preserved.    DISCS: Moderate disc height loss at L1-2 and mild disc height loss at  L5-S1.    CONUS MEDULLARIS AND CAUDA EQUINA: The conus medullaris terminates at  L1. Compression cauda equina nerve roots at L1-2 with redundancy of  the cauda equina nerve roots proximally.    PARAVERTEBRAL SOFT TISSUES AND VISUALIZED RETROPERITONEUM: The  visualized paravertebral soft tissues appear within normal limits.    EVALUATION OF INDIVIDUAL LEVELS:  L1-2: Disc bulge with facet hypertrophy results in severe narrowing  of the spinal canal and mass effect on the cauda equina nerve roots.  There is mild bilateral foraminal stenosis.    L2-3: Slight retrolisthesis with disc osteophyte complex asymmetric  to the left and facet hypertrophy. There is moderate narrowing of the  spinal canal and left neural foramen. There is mild narrowing of the  right neural foramen.    L3-4: Partially calcified disc bulge with facet hypertrophy. There is  mild narrowing of the spinal canal and bilateral foramina.    L4-5: Disc bulge asymmetric to the right with facet hypertrophy.  There is moderate right and mild left foraminal stenosis. There is  mild  narrowing of the spinal canal and crowding of the right  subarticular recess.    L5-S1: Disc bulge with facet hypertrophy. There is moderate to severe  right and moderate left foraminal stenosis. There is mild narrowing  of the spinal canal secondary to superimposed epidural lipomatosis.    LIMITED EVALUATION OF UPPER SACRUM AND SACROILIAC JOINTS: Mild  degenerative changes of the sacroiliac joints.    Impression  Multilevel degenerative changes of the lumbar spine most prominent at  L1-2 where there is severe narrowing of the spinal canal mass-effect  on the cauda equina nerve roots, slightly progressed compared to  prior MRI 07/24/2020. Redundancy of the cauda equina nerve roots  proximal to this level noted.     No image results found.       1. Sacroiliitis (CMS/HCC)  FL pain management TC      2. Inflammation of sacroiliac joint (CMS/HCC)  Sacroiliac Joint Injection           ASSESSMENT/PLAN  Delano Aguilar is a 78 y.o. male presents for R SI Joint    Our plan is as follows:  - Follow In pain clinic  - Continue to participate in physical therapy as well as physician directed home exercises  - Continue pain medications as prescribed       Vitaly White MD

## 2023-11-13 DIAGNOSIS — E66.09 CLASS 1 OBESITY DUE TO EXCESS CALORIES WITHOUT SERIOUS COMORBIDITY WITH BODY MASS INDEX (BMI) OF 34.0 TO 34.9 IN ADULT: ICD-10-CM

## 2023-11-13 RX ORDER — TOPIRAMATE 50 MG/1
50 TABLET, FILM COATED ORAL 2 TIMES DAILY
Qty: 180 TABLET | Refills: 1 | Status: SHIPPED | OUTPATIENT
Start: 2023-11-13 | End: 2024-03-08

## 2023-11-13 RX ORDER — METFORMIN HYDROCHLORIDE 1000 MG/1
1000 TABLET ORAL
Qty: 180 TABLET | Refills: 1 | Status: SHIPPED | OUTPATIENT
Start: 2023-11-13 | End: 2023-12-25

## 2023-11-13 NOTE — TELEPHONE ENCOUNTER
Requested Prescriptions     Pending Prescriptions Disp Refills    topiramate (Topamax) 50 mg tablet 180 tablet 1     Sig: Take 1 tablet (50 mg) by mouth 2 times a day.    metFORMIN (Glucophage) 1,000 mg tablet 180 tablet 1     Sig: Take 1 tablet (1,000 mg) by mouth 2 times a day with meals.

## 2023-11-14 ENCOUNTER — TELEPHONE (OUTPATIENT)
Dept: PAIN MEDICINE | Facility: CLINIC | Age: 78
End: 2023-11-14
Payer: MEDICARE

## 2023-11-15 ENCOUNTER — SPECIALTY PHARMACY (OUTPATIENT)
Dept: PHARMACY | Facility: CLINIC | Age: 78
End: 2023-11-15

## 2023-11-17 DIAGNOSIS — M48.07 LUMBOSACRAL SPINAL STENOSIS: ICD-10-CM

## 2023-11-17 DIAGNOSIS — M51.36 LUMBAR DEGENERATIVE DISC DISEASE: ICD-10-CM

## 2023-11-20 ENCOUNTER — DOCUMENTATION (OUTPATIENT)
Dept: PHYSICAL THERAPY | Facility: CLINIC | Age: 78
End: 2023-11-20
Payer: MEDICARE

## 2023-11-20 NOTE — PROGRESS NOTES
Physical Therapy    Discharge Summary    Name: Delano Aguilar  MRN: [id number]  : 1945  Date: 23    Discharge Summary: PT    Discharge Information: Date of discharge 23, Date of last visit 23, Date of evaluation 23, Number of attended visits 1, Referred by Dr. Burnett, and Referred for L/S stenosis/pain.    Therapy Summary: Patient evaluated and given exercises to address deficits following lumbar surgery.    Discharge Status:      Rehab Discharge Reason: Failed to schedule and/or keep follow-up appointment(s)

## 2023-11-21 DIAGNOSIS — M51.16 RADICULOPATHY DUE TO LUMBAR INTERVERTEBRAL DISC DISORDER: ICD-10-CM

## 2023-11-21 RX ORDER — DIAZEPAM 5 MG/1
TABLET ORAL
Qty: 4 TABLET | Refills: 0 | Status: SHIPPED | OUTPATIENT
Start: 2023-11-21 | End: 2024-01-29 | Stop reason: ALTCHOICE

## 2023-11-27 ENCOUNTER — APPOINTMENT (OUTPATIENT)
Dept: PRIMARY CARE | Facility: CLINIC | Age: 78
End: 2023-11-27
Payer: MEDICARE

## 2023-11-30 ENCOUNTER — APPOINTMENT (OUTPATIENT)
Dept: NEUROSURGERY | Facility: CLINIC | Age: 78
End: 2023-11-30
Payer: MEDICARE

## 2023-12-01 ENCOUNTER — HOSPITAL ENCOUNTER (OUTPATIENT)
Dept: RADIOLOGY | Facility: HOSPITAL | Age: 78
Discharge: HOME | End: 2023-12-01
Payer: MEDICARE

## 2023-12-01 DIAGNOSIS — M48.07 LUMBOSACRAL SPINAL STENOSIS: ICD-10-CM

## 2023-12-01 DIAGNOSIS — M51.36 LUMBAR DEGENERATIVE DISC DISEASE: ICD-10-CM

## 2023-12-01 PROCEDURE — 72148 MRI LUMBAR SPINE W/O DYE: CPT | Performed by: RADIOLOGY

## 2023-12-01 PROCEDURE — 72148 MRI LUMBAR SPINE W/O DYE: CPT

## 2023-12-07 ENCOUNTER — OFFICE VISIT (OUTPATIENT)
Dept: NEUROSURGERY | Facility: CLINIC | Age: 78
End: 2023-12-07
Payer: MEDICARE

## 2023-12-07 VITALS
WEIGHT: 200 LBS | DIASTOLIC BLOOD PRESSURE: 66 MMHG | HEART RATE: 98 BPM | BODY MASS INDEX: 32.14 KG/M2 | HEIGHT: 66 IN | SYSTOLIC BLOOD PRESSURE: 110 MMHG

## 2023-12-07 DIAGNOSIS — M46.1 SACROILIITIS (CMS-HCC): Primary | ICD-10-CM

## 2023-12-07 DIAGNOSIS — M47.816 LUMBAR SPONDYLOSIS: ICD-10-CM

## 2023-12-07 PROCEDURE — 1160F RVW MEDS BY RX/DR IN RCRD: CPT | Performed by: NEUROLOGICAL SURGERY

## 2023-12-07 PROCEDURE — 99214 OFFICE O/P EST MOD 30 MIN: CPT | Performed by: NEUROLOGICAL SURGERY

## 2023-12-07 PROCEDURE — 3078F DIAST BP <80 MM HG: CPT | Performed by: NEUROLOGICAL SURGERY

## 2023-12-07 PROCEDURE — 3074F SYST BP LT 130 MM HG: CPT | Performed by: NEUROLOGICAL SURGERY

## 2023-12-07 PROCEDURE — 1159F MED LIST DOCD IN RCRD: CPT | Performed by: NEUROLOGICAL SURGERY

## 2023-12-07 PROCEDURE — 1125F AMNT PAIN NOTED PAIN PRSNT: CPT | Performed by: NEUROLOGICAL SURGERY

## 2023-12-07 ASSESSMENT — PAIN SCALES - GENERAL: PAINLEVEL: 4

## 2023-12-07 NOTE — PROGRESS NOTES
Patient ID: Delano Aguilar is a 78 y.o. male status post RIGHT L1-L2 LUMBAR LAMINECTOMY, PARTIAL MEDIAL FACETECTOMY/FORAMINOTOMY, MICRODISCECTOMY- OPEN on 6/29/23.    I last saw him on 8/3/23 with complete resolution of right leg radiculopathy but had left buttock pain, and I recommended SI joint injection. He now has right buttock pain, no radiation down to legs. He has good strength everywhere on exam. I personally reviwed MRI L spine done on 12/1/2023 and compared to 2 preop prior MRI done on 5 17,023 which shows good decompression at L1-2 level with some residual right foraminal stenosis.  There is again multilevel severe degenerative disc disease consider with lumbar spondylosis.  There is stable moderate L2-3 spinal canal stenosis.  His focal right buttock/hip pain I do not think is related to his lumbar spine.  I recommended he continue physical therapy/home exercises and continue follow-up with Dr. Yoly trevion in pain management.  He may benefit from additional SI joint injection this time on the right side.      12/07/23 at 10:49 AM - Sourav Burnett MD    Prep Time On Date of the Patient Encounter:    10 Minutes  Time Directly with Patient/Family/Caregiver:    15 Minutes  Additional Time Spent on Patient Care Activities:   0 Minutes  Documentation Time:       5 Minutes  Other Time Spent:       0 Minutes    Details of Other Time Spent:      Total Time on Date of Patient Encounter:    30 Minutes

## 2023-12-11 ENCOUNTER — TELEMEDICINE (OUTPATIENT)
Dept: PAIN MEDICINE | Facility: CLINIC | Age: 78
End: 2023-12-11
Payer: MEDICARE

## 2023-12-11 DIAGNOSIS — M96.1 POSTLAMINECTOMY SYNDROME, LUMBAR REGION: ICD-10-CM

## 2023-12-11 DIAGNOSIS — M46.1 INFLAMMATION OF SACROILIAC JOINT (CMS-HCC): ICD-10-CM

## 2023-12-11 PROCEDURE — 99442 PR PHYS/QHP TELEPHONE EVALUATION 11-20 MIN: CPT | Performed by: ANESTHESIOLOGY

## 2023-12-11 RX ORDER — IBUPROFEN 600 MG/1
600 TABLET ORAL DAILY PRN
Qty: 30 TABLET | Refills: 0 | Status: SHIPPED | OUTPATIENT
Start: 2023-12-11 | End: 2024-01-10

## 2023-12-11 RX ORDER — OXYCODONE AND ACETAMINOPHEN 5; 325 MG/1; MG/1
1 TABLET ORAL 2 TIMES DAILY PRN
Qty: 56 TABLET | Refills: 0 | Status: SHIPPED | OUTPATIENT
Start: 2023-12-11 | End: 2024-01-08

## 2023-12-11 NOTE — PROGRESS NOTES
Is a virtual visit conducted with telephone call.  Duration of the visit is 11 minutes.  History Of Present Illness  Delano Aguilar is a 78 y.o. male presenting with low back pain radiating to the right lower extremity.  Patient underwent a L1-L2 lumbar laminectomy in June 2023.  Patient continues to complain of pain in the right lower sacroiliac area for which she had a right SI joint injection that initially did not provide him any relief however 3 weeks later he started feeling the effect of the injection and is providing him provided him good relief.    Today I discussed the results of MRI of her lumbar spine done in December 2023 that showed L4-L5 severe right and moderate left neuroforaminal stenosis and L5-S1 bilateral neuroforaminal stenosis right greater than left.    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    Surgical History  He has a past surgical history that includes Other surgical history (09/10/2021); Other surgical history (09/10/2021); Other surgical history (12/29/2019); US guided thyroid biopsy (8/18/2014); Tonsillectomy (01/22/2015); and Sinus surgery (01/22/2015).     Social History  He reports that he has been smoking cigars. He has never used smokeless tobacco. No history on file for alcohol use and drug use.    Family History  Family History   Problem Relation Name Age of Onset    Alzheimer's disease Mother      Blindness Mother          Legally ([place] Definition)    Colon cancer Father      Heart failure Father       Fibromyalgia Sister          Allergies  Ampicillin    Review of systems:   13-point review of systems done and negative except as noted in HPI      Physical Exam        Last Recorded Vitals  There were no vitals taken for this visit.    Relevant Results            Last OARRS Review: No data recorded    I have personally reviewed the OARRS report for Delano Aguilar I have considered the risks of abuse, dependence, addiction and diversion       Assessment/Plan   Diagnoses and all orders for this visit:  Inflammation of sacroiliac joint (CMS/HCC)  Postlaminectomy syndrome, lumbar region      Plan  Percocets 5 mg 1 tablet twice daily  Ibuprofen 600 mg 1 tablet daily  Consider bilateral L5 transforaminal epidural steroid injection.       Yonatan Frederick MD

## 2023-12-21 DIAGNOSIS — F32.0 MAJOR DEPRESSIVE DISORDER, SINGLE EPISODE, MILD (CMS-HCC): ICD-10-CM

## 2023-12-21 RX ORDER — BUPROPION HYDROCHLORIDE 300 MG/1
TABLET ORAL
Qty: 90 TABLET | Refills: 1 | Status: SHIPPED | OUTPATIENT
Start: 2023-12-21 | End: 2024-03-22

## 2023-12-24 DIAGNOSIS — F32.0 MAJOR DEPRESSIVE DISORDER, SINGLE EPISODE, MILD (CMS-HCC): ICD-10-CM

## 2023-12-24 DIAGNOSIS — M54.42 CHRONIC LEFT-SIDED LOW BACK PAIN WITH LEFT-SIDED SCIATICA: ICD-10-CM

## 2023-12-24 DIAGNOSIS — G89.29 CHRONIC LEFT-SIDED LOW BACK PAIN WITH LEFT-SIDED SCIATICA: ICD-10-CM

## 2023-12-24 DIAGNOSIS — K21.9 GASTRO-ESOPHAGEAL REFLUX DISEASE WITHOUT ESOPHAGITIS: ICD-10-CM

## 2023-12-24 DIAGNOSIS — E66.09 CLASS 1 OBESITY DUE TO EXCESS CALORIES WITHOUT SERIOUS COMORBIDITY WITH BODY MASS INDEX (BMI) OF 34.0 TO 34.9 IN ADULT: ICD-10-CM

## 2023-12-25 RX ORDER — GABAPENTIN 300 MG/1
300 CAPSULE ORAL 3 TIMES DAILY
Qty: 270 CAPSULE | Refills: 0 | Status: SHIPPED | OUTPATIENT
Start: 2023-12-25 | End: 2024-01-29 | Stop reason: SDUPTHER

## 2023-12-25 RX ORDER — METFORMIN HYDROCHLORIDE 1000 MG/1
1000 TABLET ORAL
Qty: 180 TABLET | Refills: 0 | Status: SHIPPED | OUTPATIENT
Start: 2023-12-25 | End: 2024-03-25

## 2023-12-25 RX ORDER — PANTOPRAZOLE SODIUM 40 MG/1
TABLET, DELAYED RELEASE ORAL
Qty: 90 TABLET | Refills: 0 | Status: SHIPPED | OUTPATIENT
Start: 2023-12-25 | End: 2024-03-20

## 2023-12-25 RX ORDER — ESCITALOPRAM OXALATE 5 MG/1
TABLET ORAL
Qty: 90 TABLET | Refills: 0 | Status: SHIPPED | OUTPATIENT
Start: 2023-12-25 | End: 2024-01-29 | Stop reason: ALTCHOICE

## 2024-01-04 ENCOUNTER — APPOINTMENT (OUTPATIENT)
Dept: PRIMARY CARE | Facility: CLINIC | Age: 79
End: 2024-01-04
Payer: MEDICARE

## 2024-01-27 ENCOUNTER — SPECIALTY PHARMACY (OUTPATIENT)
Dept: PHARMACY | Facility: CLINIC | Age: 79
End: 2024-01-27

## 2024-01-27 PROCEDURE — RXMED WILLOW AMBULATORY MEDICATION CHARGE

## 2024-01-27 NOTE — PROGRESS NOTES
Subjective   Patient ID: Delano Aguilar is a 78 y.o. male who presents for Follow-up.    HPI     Has lost 20 lbs in the past 6 months, no side effects from metformin     Reports tremor in left hand, worse with activity. Started a few years ago. Maybe a little worse but not much. No issues with gait, memory or speech.     Noticed a sensation that he must move his legs when he laying down, they feel better after he moved them. Happens most nights.           Review of Systems   All other systems reviewed and are negative.      Objective   There were no vitals taken for this visit.    Physical Exam  Constitutional:       Appearance: Normal appearance.   HENT:      Head: Normocephalic and atraumatic.   Cardiovascular:      Rate and Rhythm: Normal rate and regular rhythm.      Heart sounds: Normal heart sounds. No murmur heard.     No gallop.   Pulmonary:      Effort: Pulmonary effort is normal. No respiratory distress.      Breath sounds: No wheezing or rales.   Skin:     General: Skin is warm and dry.      Findings: No rash.   Neurological:      Mental Status: He is alert and oriented to person, place, and time. Mental status is at baseline.   Psychiatric:         Mood and Affect: Mood normal.         Behavior: Behavior normal.         Assessment/Plan       #Depression   -Stable. Stop escitalopram to see if this helps with fatigue and low libido.   -Cont bupropion XL 300mg daily      #Obesity  -Down 20 lbs since April. Cont metformin 1000mg BID and topiramate 50mg BIOD      #Lumbar radiculopathy, neck pain   -s/p L1-L2 laminectomy. Follows with pain management   -Cont gabapentin 300mg at bedtime       #CAD s/p CABG, HLD  -CABG x3 11/2019 2/2 NSTEMI. Follows with cardiology. Continue ASA ,statin, Zetia, Praluent-per cardiology, goal <55  -Order CBC, CMP and lipids     #Tremor  -Likely essential tremor (none on exam today). Check TSH. Declines neuro referral.      #HTN  -Continue losartan 50mg daily and metoprolol ER  25mg daily, goal <130/80     #TANO  -Reports compliance with CPAP    #RLS  -Declines ropinirole. Consider checking iron level      Colonoscopy: 3/2022  PSA: 6/15/20     RTC 6 mo

## 2024-01-29 ENCOUNTER — OFFICE VISIT (OUTPATIENT)
Dept: PRIMARY CARE | Facility: CLINIC | Age: 79
End: 2024-01-29
Payer: MEDICARE

## 2024-01-29 VITALS
OXYGEN SATURATION: 97 % | BODY MASS INDEX: 32.44 KG/M2 | DIASTOLIC BLOOD PRESSURE: 69 MMHG | WEIGHT: 201 LBS | HEART RATE: 99 BPM | SYSTOLIC BLOOD PRESSURE: 114 MMHG

## 2024-01-29 DIAGNOSIS — F32.A DEPRESSION, UNSPECIFIED DEPRESSION TYPE: Primary | ICD-10-CM

## 2024-01-29 DIAGNOSIS — E78.5 HYPERLIPIDEMIA, UNSPECIFIED HYPERLIPIDEMIA TYPE: ICD-10-CM

## 2024-01-29 DIAGNOSIS — I10 ESSENTIAL HYPERTENSION: ICD-10-CM

## 2024-01-29 DIAGNOSIS — G89.29 CHRONIC LEFT-SIDED LOW BACK PAIN WITH LEFT-SIDED SCIATICA: ICD-10-CM

## 2024-01-29 DIAGNOSIS — M54.42 CHRONIC LEFT-SIDED LOW BACK PAIN WITH LEFT-SIDED SCIATICA: ICD-10-CM

## 2024-01-29 DIAGNOSIS — R25.1 TREMOR: ICD-10-CM

## 2024-01-29 PROCEDURE — 3078F DIAST BP <80 MM HG: CPT | Performed by: INTERNAL MEDICINE

## 2024-01-29 PROCEDURE — 1160F RVW MEDS BY RX/DR IN RCRD: CPT | Performed by: INTERNAL MEDICINE

## 2024-01-29 PROCEDURE — 1159F MED LIST DOCD IN RCRD: CPT | Performed by: INTERNAL MEDICINE

## 2024-01-29 PROCEDURE — 99214 OFFICE O/P EST MOD 30 MIN: CPT | Performed by: INTERNAL MEDICINE

## 2024-01-29 PROCEDURE — 1125F AMNT PAIN NOTED PAIN PRSNT: CPT | Performed by: INTERNAL MEDICINE

## 2024-01-29 PROCEDURE — 3074F SYST BP LT 130 MM HG: CPT | Performed by: INTERNAL MEDICINE

## 2024-01-29 RX ORDER — GABAPENTIN 300 MG/1
300 CAPSULE ORAL NIGHTLY
Qty: 90 CAPSULE | Refills: 1 | Status: SHIPPED | OUTPATIENT
Start: 2024-01-29 | End: 2024-07-27

## 2024-01-29 NOTE — PATIENT INSTRUCTIONS
Please complete fasting blood work. Fast for 10 hours, black coffee and water the morning of labs are OK.     Stop escitalopram to see if this helps with sleepiness, can be from topiramate as well     Come back in 6 months

## 2024-01-31 ENCOUNTER — APPOINTMENT (OUTPATIENT)
Dept: PRIMARY CARE | Facility: CLINIC | Age: 79
End: 2024-01-31

## 2024-02-07 ENCOUNTER — PHARMACY VISIT (OUTPATIENT)
Dept: PHARMACY | Facility: CLINIC | Age: 79
End: 2024-02-07
Payer: MEDICARE

## 2024-02-17 LAB
ATRIAL RATE: 170 BPM
Q ONSET: 223 MS
QRS COUNT: 20 BEATS
QRS DURATION: 80 MS
QT INTERVAL: 344 MS
QTC CALCULATION(BAZETT): 506 MS
QTC FREDERICIA: 445 MS
R AXIS: 50 DEGREES
T AXIS: 54 DEGREES
T OFFSET: 395 MS
VENTRICULAR RATE: 130 BPM

## 2024-02-22 ENCOUNTER — OFFICE VISIT (OUTPATIENT)
Dept: PRIMARY CARE | Facility: CLINIC | Age: 79
End: 2024-02-22
Payer: MEDICARE

## 2024-02-22 VITALS
BODY MASS INDEX: 32.93 KG/M2 | HEART RATE: 77 BPM | SYSTOLIC BLOOD PRESSURE: 123 MMHG | OXYGEN SATURATION: 98 % | WEIGHT: 204 LBS | DIASTOLIC BLOOD PRESSURE: 70 MMHG

## 2024-02-22 DIAGNOSIS — L03.114 CELLULITIS OF LEFT UPPER EXTREMITY: ICD-10-CM

## 2024-02-22 DIAGNOSIS — S61.502A OPEN WOUND OF LEFT WRIST, INITIAL ENCOUNTER: Primary | ICD-10-CM

## 2024-02-22 PROCEDURE — 3078F DIAST BP <80 MM HG: CPT | Performed by: INTERNAL MEDICINE

## 2024-02-22 PROCEDURE — 3074F SYST BP LT 130 MM HG: CPT | Performed by: INTERNAL MEDICINE

## 2024-02-22 PROCEDURE — 1160F RVW MEDS BY RX/DR IN RCRD: CPT | Performed by: INTERNAL MEDICINE

## 2024-02-22 PROCEDURE — 1125F AMNT PAIN NOTED PAIN PRSNT: CPT | Performed by: INTERNAL MEDICINE

## 2024-02-22 PROCEDURE — 1159F MED LIST DOCD IN RCRD: CPT | Performed by: INTERNAL MEDICINE

## 2024-02-22 PROCEDURE — 99213 OFFICE O/P EST LOW 20 MIN: CPT | Performed by: INTERNAL MEDICINE

## 2024-02-22 RX ORDER — DOXYCYCLINE 100 MG/1
100 CAPSULE ORAL 2 TIMES DAILY
Qty: 14 CAPSULE | Refills: 0 | Status: SHIPPED | OUTPATIENT
Start: 2024-02-22 | End: 2024-02-29

## 2024-02-22 NOTE — PATIENT INSTRUCTIONS
Take your antibiotic twice a day for 7 days.  You will receive a call from Wound Care to make an appointment.

## 2024-02-22 NOTE — PROGRESS NOTES
Subjective   Patient ID: Delano Aguilar is a 78 y.o. male who presents for Wound Check (Last Friday /fall ).    HPI   Last Friday (2/16) had 3 martinis and fell into the lit fireplace and injured his left wrist. He scraped and or burned his wrist and got soot in the wound. He wiped it with a rag, but didn't clean out the soot. Started using hydrogen peroxide and neosporin on Monday or Tuesday. No fevers, chills or night sweats.    Review of Systems   All other systems reviewed and are negative.        Objective   /70   Pulse 77   Wt 92.5 kg (204 lb)   SpO2 98%   BMI 32.93 kg/m²     Physical Exam  Constitutional:       General: He is not in acute distress.     Appearance: He is normal weight. He is not ill-appearing.   Skin:     General: Skin is warm and dry.      Comments: ~2cm diameter ulceration of the left lateral wrist, either abrasion or burn with mild surrounding erythema   Neurological:      Mental Status: He is alert.         Assessment/Plan   #left wrist wound  -abrasion vs burn  -doxycline 100 mg BID for 7 days  -referral to wound care       Elan Palafox DO  Internal Medicine PGY1

## 2024-02-26 ENCOUNTER — CLINICAL SUPPORT (OUTPATIENT)
Dept: WOUND CARE | Facility: HOSPITAL | Age: 79
End: 2024-02-26
Payer: MEDICARE

## 2024-02-26 DIAGNOSIS — S61.502A OPEN WOUND OF LEFT WRIST, INITIAL ENCOUNTER: ICD-10-CM

## 2024-02-26 PROCEDURE — 16020 DRESS/DEBRID P-THICK BURN S: CPT

## 2024-02-26 PROCEDURE — 99213 OFFICE O/P EST LOW 20 MIN: CPT | Mod: 25

## 2024-02-28 ENCOUNTER — APPOINTMENT (OUTPATIENT)
Dept: PRIMARY CARE | Facility: CLINIC | Age: 79
End: 2024-02-28

## 2024-02-29 ENCOUNTER — SPECIALTY PHARMACY (OUTPATIENT)
Dept: PHARMACY | Facility: CLINIC | Age: 79
End: 2024-02-29

## 2024-02-29 NOTE — PROGRESS NOTES
[facility] Clinical Note    Delano Aguilar is a 78 y.o. male, who is on the specialty pharmacy service of: [facility].  Delano Aguilar is taking: Praluent 150mg under the skin every 2 weeks.     Delano was contacted on 2/29/2024. He denies any side effects, efficacy concerns, or adherence barriers at this time. Reminded him to contact provider to clarify when to complete ordered labs in chart (pt was not aware anything was ordered).    Refer to the encounter summary report for documentation details about patient counseling and education.      Impression/Plan  Is patient high risk? Yes- geriatric with PMH significant for CAD with NSTEMI s/p CABG, HTN, HLD  Is laboratory follow-up needed? As directed by provider  Is a clinical intervention needed?  Not at this time  Next assessment date?  Approx. 8/29/24  Additional comments:    Medication Adherence  The importance of adherence was discussed with the patient and they were advised to take the medication as prescribed by their provider. Delano was encouraged to call his physician's office if they have a question regarding a missed dose.        Patient advised to contact the pharmacy if there are any changes to her medication list, including prescriptions, OTC medications, herbal products, or supplements. Patient was advised of [facility]’s dispensing process, refill timeline, contact information ([phone]), and patient management follow up. Patient confirmed understanding of education conducted during assessment. All patient questions and concerns were addressed to the best of my ability. Patient was encouraged to contact the specialty pharmacy with any questions or concerns.    Confirmed follow-up outreaches are properly scheduled. Reviewed goals of therapy in the program targets.    Elisha Ellis, JudithD

## 2024-03-04 ENCOUNTER — CLINICAL SUPPORT (OUTPATIENT)
Dept: WOUND CARE | Facility: HOSPITAL | Age: 79
End: 2024-03-04
Payer: MEDICARE

## 2024-03-04 PROCEDURE — 16020 DRESS/DEBRID P-THICK BURN S: CPT

## 2024-03-06 DIAGNOSIS — E66.09 CLASS 1 OBESITY DUE TO EXCESS CALORIES WITHOUT SERIOUS COMORBIDITY WITH BODY MASS INDEX (BMI) OF 34.0 TO 34.9 IN ADULT: ICD-10-CM

## 2024-03-06 NOTE — CONSULTS
Service:   Service: Medicine     Consult:  Consult requested by (Attending Name): Sourav Burnett   Reason: Hospitalist/Teaching Service or PCP for Medical  Management     History of Present Illness:   HPI:    ZENON GOMEZ is a 78 year old Male with lumbar spinal stenosis with radiculopathy who was admitted yesterday after undergoing L1-L2 lumbar laminectomy, partial  medial facetectomy/foraminotomy and open microdiscectomy.  His surgery went well and there have been no postprocedure complications thus far.  We have been consulted to help with management of medical issues.  Patient has no complaint at this time.  He  denies fever, chills, cough, shortness of breath, chest pain, nausea or vomiting.    Past Medical History:  Hypertension  Hyperlipidemia  Coronary artery disease s/p four-vessel CABG  NSTEMI  Obstructive sleep apnea on nocturnal CPAP  Pulmonary embolism  Hairy cell leukemia currently in remission  Anxiety  Depression  Bilateral sensorineural hearing loss  Class I obesity  COVID-19  Gastroesophageal reflux disease  Lumbar degenerative disc disease  Lumbar spinal stenosis with radiculopathy    Past Surgical History:  Four-vessel CABG  Nasal septoplasty  Sinus surgery  Tonsillectomy  Uvulopalatopharyngoplasty  Right carpal tunnel release  Left carpal tunnel release  Left thyroid lobectomy    Review Family/Social History and ROS:   Family History:  Cancer: no    CAD: yes    Diabetes: no    VTE: no      Social History:    Smoking Status: occasional user (use that is infrequent,  sporadic, not on a daily basis) (1)   Alcohol Use: occasionally (1)   Drug Use: denies  (1)   Drug 2 Use: denies  (1)   Occupation: Retired   Social History:    . Has one child      Constitutional: NEGATIVE: Fever, Chills, Anorexia,  Weight Loss, Malaise     Eyes: NEGATIVE: Blurry Vision, Drainage, Diploplia,  Redness, Vision Loss/ Change     ENMT: NEGATIVE: Nasal Discharge, Nasal Congestion,  Ear Pain, Mouth Pain,  Throat Pain     Respiratory: NEGATIVE: Dry Cough, Productive Cough,  Hemoptysis, Wheezing, Shortness of Breath     Cardiac: NEGATIVE: Chest Pain, Dyspnea on Exertion,  Orthopnea, Palpitations, Syncope     Gastrointestinal: NEGATIVE: Nausea, Vomiting, Diarrhea,  Constipation, Abdominal Pain     Genitourinary: NEGATIVE: Discharge, Dysuria, Flank  Pain, Frequency, Hematuria     Musculoskeletal: NEGATIVE: Decreased ROM, Pain, Swelling,  Stiffness, Weakness     Neurological: NEGATIVE: Dizziness, Confusion, Headache,  Seizures, Syncope     Psychiatric: NEGATIVE: Mood Changes, Anxiety, Hallucinations,  Sleep Changes, Suicidal Ideas     Skin: NEGATIVE: Mass, Pain, Pruritus, Rash, Ulcer     Endocrine: COMMENTS: Pt denies being diabetic even though he is on metformin     Hematologic/Lymph: NEGATIVE: Anemia, Bruising, Easy  Bleeding, Night Sweats, Petechiae     Allergic/Immunologic: NEGATIVE: Anaphylaxis, Itchy/  Teary Eyes, Itching, Sneezing, Swelling              Allergies:  ·  No Known Allergies :     Objective:     Objective Information:        T   P  R  BP   MAP  SpO2   Value  36.1  78  18  110/61      93%  Date/Time 6/30 0:30 6/30 0:30 6/30 0:30 6/30 0:30    6/30 0:30  Range  (36.1C - 36.3C )  (72 - 78 )  (18 - 18 )  (101 - 110 )/ (56 - 65 )    (93% - 94% )   As of 29-Jun-2023 18:18:00, patient is on 2 L/min of oxygen via room air.        Pain reported at 6/29 20:58: 1 = Mild    Physical Exam by System:    Constitutional: Elderly male in no apparent distress,  alert and oriented X3, cooperative   Eyes: EOMI, clear sclera, no conjunctival injection   ENMT: mucous membranes moist, no apparent injury,  no lesions seen   Head/Neck: Neck supple, no apparent injury, thyroid  without mass or tenderness, No JVD, trachea midline, no bruits   Respiratory/Thorax: CTAB, normal breath sounds with  good chest expansion, no rales, rhonchi or wheezes, thorax symmetric   Cardiovascular: Regular rate and rhythm, with a soft  systolic  murmur, no rub or gallop.   Gastrointestinal: Soft, non-tender, no rebound tenderness  or guarding, no masses palpable, no organomegaly, +BS, no bruits   Musculoskeletal: Back wound dressing intact, clean  and dry with NILS drain in place   Extremities: No cyanosis, clubbing or edema. Distal  pulses palpable 2+   Neurological: Alert and oriented x3. No focal motor  or sensory deficits   Lymphatic: No significant lymphadenopathy   Psychological: Appropriate mood and behavior   Skin: Intact warm and dry, no significant lesions  or rashes present     Medications:    Medications:          Continuous Medications       --------------------------------  No continuous medications are active       Scheduled Medications       --------------------------------    1. Acetaminophen:  975  mg  Oral  Every 6 Hours    2. buPROPion (WELLBUTRIN) Immediate Release:  100  mg  Oral  Daily    3. Docusate:  100  mg  Oral  2 Times a Day    4. Escitalopram:  5  mg  Oral  Daily    5. Losartan:  50  mg  Oral  Daily    6. metFORMIN Extended Release (GLUCOPHAGE XR):  500  mg  Oral  2 Times a Day    7. Metoprolol Succinate Extended Release:  25  mg  Oral  Daily    8. Pantoprazole:  40  mg  Oral  Daily    9. Polyethylene Glycol:  17  gram(s)  Oral  2 Times a Day         PRN Medications       --------------------------------    1. Cyclobenzaprine:  10  mg  Oral  3 Times a Day    2. HYDROmorphone Injectable:  0.2  mg  IntraVenous Push  Every 6 Hours    3. Ondansetron Injectable:  4  mg  IntraVenous Push  Every 6 Hours    4. oxyCODONE Immediate Release:  5  mg  Oral  Every 6 Hours    5. oxyCODONE Immediate Release:  10  mg  Oral  Every 6 Hours          Recent Lab Results:    Results:        I have reviewed these laboratory results:    Glucose_POCT  Trending View      Result 29-Jun-2023 17:21:00  29-Jun-2023 11:44:00    Glucose-POCT 113   H   157   H            Radiology Results:    Results:        Impression:  Xray Fluoroscopy Up To 1 Hr or less [Quinn  "29 2023  4:01PM]          Consult Status:  Consult Order ID: [id number]     Problem/Assessment/Plan:    Impression 1: Lumbar spinal stenosis with radiculopathy   Plan for Impression 1: S/P L1-L2 lumbar laminectomy,  partial medial facetectomy/foraminotomy and open microdiscectomy.  Pain meds as needed  Post op management per primary service  Will follow up on am labs   Impression 2: HTN   Plan for Impression 2: Resume Losartan and Metoprolol  and monitor   Impression 3: GERD   Plan for Impression 3: Clinically stable  Resume PPI   Impression 4: TANO   Plan for Impression 4: On nocturnal CPAP - pt brought  own home machine   Impression 5: History of CAD s/p 4V CABG   Plan for Impression 5: Clinically stable      Thank you for the consult. Please call with questions.       Electronic Signatures:  Jamarcus Young)  (Signed 30-Jun-2023 06:55)   Authored: Service, History of Present Illness, Review  Family/Social History and ROS, Allergies, Objective, Assessment/Recommendations, Note Completion      Last Updated: 30-Jun-2023 06:55 by Jamarcus Young)    References:  1.  Data Referenced From \"Patient Profile - Adult v2\" 29-Jun-2023 18:47   "

## 2024-03-08 RX ORDER — TOPIRAMATE 50 MG/1
50 TABLET, FILM COATED ORAL 2 TIMES DAILY
Qty: 180 TABLET | Refills: 1 | Status: SHIPPED | OUTPATIENT
Start: 2024-03-08 | End: 2024-09-04

## 2024-03-08 NOTE — TELEPHONE ENCOUNTER
Requested Prescriptions     Pending Prescriptions Disp Refills    topiramate (Topamax) 50 mg tablet [Pharmacy Med Name: topiramate 50 mg tablet] 180 tablet 1     Sig: Take 1 tablet (50 mg) by mouth 2 times a day.

## 2024-03-11 ENCOUNTER — APPOINTMENT (OUTPATIENT)
Dept: WOUND CARE | Facility: HOSPITAL | Age: 79
End: 2024-03-11
Payer: MEDICARE

## 2024-03-18 DIAGNOSIS — K21.9 GASTRO-ESOPHAGEAL REFLUX DISEASE WITHOUT ESOPHAGITIS: ICD-10-CM

## 2024-03-20 RX ORDER — PANTOPRAZOLE SODIUM 40 MG/1
TABLET, DELAYED RELEASE ORAL
Qty: 90 TABLET | Refills: 1 | Status: SHIPPED | OUTPATIENT
Start: 2024-03-20

## 2024-03-21 DIAGNOSIS — F32.0 MAJOR DEPRESSIVE DISORDER, SINGLE EPISODE, MILD (CMS-HCC): ICD-10-CM

## 2024-03-21 DIAGNOSIS — E66.09 CLASS 1 OBESITY DUE TO EXCESS CALORIES WITHOUT SERIOUS COMORBIDITY WITH BODY MASS INDEX (BMI) OF 34.0 TO 34.9 IN ADULT: ICD-10-CM

## 2024-03-21 DIAGNOSIS — I10 ESSENTIAL (PRIMARY) HYPERTENSION: Primary | ICD-10-CM

## 2024-03-22 RX ORDER — LOSARTAN POTASSIUM 50 MG/1
50 TABLET ORAL DAILY
Qty: 90 TABLET | Refills: 3 | Status: SHIPPED | OUTPATIENT
Start: 2024-03-22

## 2024-03-22 RX ORDER — BUPROPION HYDROCHLORIDE 300 MG/1
TABLET ORAL
Qty: 90 TABLET | Refills: 1 | Status: SHIPPED | OUTPATIENT
Start: 2024-03-22

## 2024-03-22 NOTE — TELEPHONE ENCOUNTER
Requested Prescriptions     Pending Prescriptions Disp Refills    metFORMIN (Glucophage) 1,000 mg tablet [Pharmacy Med Name: metformin 1,000 mg tablet] 180 tablet 1     Sig: Take 1 tablet (1,000 mg) by mouth 2 times a day with meals.    escitalopram (Lexapro) 5 mg tablet [Pharmacy Med Name: escitalopram 5 mg tablet] 90 tablet 1     Sig: TAKE 1 TABLET DAILY.

## 2024-03-25 RX ORDER — ESCITALOPRAM OXALATE 5 MG/1
TABLET ORAL
Qty: 90 TABLET | Refills: 1 | Status: SHIPPED | OUTPATIENT
Start: 2024-03-25

## 2024-03-25 RX ORDER — METFORMIN HYDROCHLORIDE 1000 MG/1
1000 TABLET ORAL
Qty: 180 TABLET | Refills: 1 | Status: SHIPPED | OUTPATIENT
Start: 2024-03-25 | End: 2025-03-25

## 2024-04-17 ENCOUNTER — OFFICE VISIT (OUTPATIENT)
Dept: SLEEP MEDICINE | Facility: CLINIC | Age: 79
End: 2024-04-17
Payer: MEDICARE

## 2024-04-17 VITALS
WEIGHT: 204 LBS | SYSTOLIC BLOOD PRESSURE: 102 MMHG | RESPIRATION RATE: 18 BRPM | HEIGHT: 66 IN | HEART RATE: 86 BPM | BODY MASS INDEX: 32.78 KG/M2 | DIASTOLIC BLOOD PRESSURE: 68 MMHG | OXYGEN SATURATION: 96 %

## 2024-04-17 DIAGNOSIS — G47.33 OSA ON CPAP: Primary | ICD-10-CM

## 2024-04-17 DIAGNOSIS — E66.9 OBESITY (BMI 30-39.9): ICD-10-CM

## 2024-04-17 DIAGNOSIS — I10 BENIGN ESSENTIAL HYPERTENSION: ICD-10-CM

## 2024-04-17 PROCEDURE — 99214 OFFICE O/P EST MOD 30 MIN: CPT | Performed by: INTERNAL MEDICINE

## 2024-04-17 PROCEDURE — 1159F MED LIST DOCD IN RCRD: CPT | Performed by: INTERNAL MEDICINE

## 2024-04-17 PROCEDURE — 1125F AMNT PAIN NOTED PAIN PRSNT: CPT | Performed by: INTERNAL MEDICINE

## 2024-04-17 PROCEDURE — 3074F SYST BP LT 130 MM HG: CPT | Performed by: INTERNAL MEDICINE

## 2024-04-17 PROCEDURE — 3078F DIAST BP <80 MM HG: CPT | Performed by: INTERNAL MEDICINE

## 2024-04-17 PROCEDURE — 1160F RVW MEDS BY RX/DR IN RCRD: CPT | Performed by: INTERNAL MEDICINE

## 2024-04-17 PROCEDURE — G2211 COMPLEX E/M VISIT ADD ON: HCPCS | Performed by: INTERNAL MEDICINE

## 2024-04-17 ASSESSMENT — PAIN SCALES - GENERAL: PAINLEVEL: 3

## 2024-04-17 ASSESSMENT — SLEEP AND FATIGUE QUESTIONNAIRES
HOW LIKELY ARE YOU TO NOD OFF OR FALL ASLEEP WHILE SITTING AND READING: WOULD NEVER DOZE
HOW LIKELY ARE YOU TO NOD OFF OR FALL ASLEEP WHILE LYING DOWN TO REST IN THE AFTERNOON WHEN CIRCUMSTANCES PERMIT: HIGH CHANCE OF DOZING
HOW LIKELY ARE YOU TO NOD OFF OR FALL ASLEEP WHILE SITTING AND TALKING TO SOMEONE: WOULD NEVER DOZE
ESS-CHAD TOTAL SCORE: 5
HOW LIKELY ARE YOU TO NOD OFF OR FALL ASLEEP WHILE WATCHING TV: WOULD NEVER DOZE
HOW LIKELY ARE YOU TO NOD OFF OR FALL ASLEEP WHILE SITTING QUIETLY AFTER LUNCH WITHOUT ALCOHOL: MODERATE CHANCE OF DOZING
SITING INACTIVE IN A PUBLIC PLACE LIKE A CLASS ROOM OR A MOVIE THEATER: WOULD NEVER DOZE
HOW LIKELY ARE YOU TO NOD OFF OR FALL ASLEEP WHEN YOU ARE A PASSENGER IN A CAR FOR AN HOUR WITHOUT A BREAK: WOULD NEVER DOZE
HOW LIKELY ARE YOU TO NOD OFF OR FALL ASLEEP IN A CAR, WHILE STOPPED FOR A FEW MINUTES IN TRAFFIC: WOULD NEVER DOZE

## 2024-04-17 ASSESSMENT — ENCOUNTER SYMPTOMS
DEPRESSION: 1
LOSS OF SENSATION IN FEET: 0
OCCASIONAL FEELINGS OF UNSTEADINESS: 1

## 2024-04-17 NOTE — PROGRESS NOTES
[facility] SLEEP MEDICINE [facility]IN[facility]  FOLLOW-UP VISIT NOTE    PCP: Isaac Gallegos DO    HISTORY OF PRESENT ILLNESS     Patient ID: Delano Aguilar is a 79 y.o. male who presents for follow-up of TANO on PAP, yearly checkup.     Patient is here alone today.  To review, patient's medical history is notable for obesity (BMI 31), hypertension, GERD, PE, CAD, status post CABG, and TANO with CPAP intolerance.     Interval History  Patient was last seen in 4/19/2023. Plan was to continue PAP and follow-up yearly.  Since last visit, patient has been trying to use machine but not getting to 4 hours usage machine due to pressure intolerance and mask leak.  Patient denies machine problems, air hunger, aerophagia, skin irritation, and nasal congestion.   Complains of dry mouth, mask leak, and sensation of pressure too much .  The following are patient's perceived benefits of PAP: no snoring on PAP, refreshing sleep, and decreased daytime sleepiness and/or fatigue.    RLS Follow-up:   Used to have it every night during last visit    SLEEP STUDY HISTORY (personally reviewed raw data such as interpretation report, data sheet, hypnogram, and titration table if available and applicable)  Split-night PSG 9/22/2021: pre-PAP AHI 65.6, supine , REM AHI N/A, SpO2 yovana 83%, spent 12.1 minutes with SpO2<89%. CPAP was started at 4-12 cm H2O. No REM supine sleep during titration. At CPAP 10 cm H2O, AHI 3.1, SpO2 yovana 90%. PLMS idnex during diagnostic portion was 15.4. PLMS index during titration was 20.1.     SLEEP-WAKE SCHEDULE  Bedtime:  11 PM to 12 MN daily  Subjective sleep latency: less than 10 minutes  Number of awakenings: none. Patient sleeps thru the night.  Final wake time:  10 AM daily  Out of bed time:  10 AM daily  Shift work: No   Naps: once daily for 1.5 hours at 4 PM  Feels rested after a nap: Yes (not using CPAP during naps)  Average sleep duration (excluding naps): 12-14 hours    SLEEP ENVIRONMENT  Sleep  "location: bed  Sleep status: sleeps with pet dog . Wife sleeps in a separate bedroom  Preferred sleep position: side    SOCIAL HISTORY  Smokin cigar every 2 weeks  ETOH: 1 glass wine once a week  Marijuana: no  Caffeine: 2 cups coffee daily  Sleep aids: no but takes gabapentin for back pain    WEIGHT: lost 20 lbs in 1 year     Claustrophobia: No     Active Problems, Allergy List, Medication List, and PMH/PSH/FH/Social Hx have been reviewed and reconciled in chart. No significant changes unless documented in the pertinent chart section. Updates made when necessary.     PHYSICAL EXAM     VITAL SIGNS: /68   Pulse 86   Resp 18   Ht 1.676 m (5' 6\")   Wt 92.5 kg (204 lb)   SpO2 96%   BMI 32.93 kg/m²     NECK CIRCUMFERENCE: 18.5 inches    Today ESS: 5  Last visit ESS: 7  LYN: 13    Physical Exam  Constitutional: Awake, not in distress  Skin: Warm, no rash  Neuro: No tremors, moves all extremities  Psych: alert and oriented to time, place, and person    RESULTS/DATA     No results found for: \"IRON\", \"TRANSFERRIN\", \"IRONSAT\", \"TIBC\", \"FERRITIN\"    Bicarbonate   Date Value Ref Range Status   2023 26 21 - 32 mmol/L Final       PAP Adherence  A PAP adherence data was obtained and reviewed personally today in clinic. (see scanned document in EPIC)      ASSESSMENT/PLAN     Delano Aguilar is a 79 y.o. male who returns in [facility] for the following problems:    OBSTRUCTIVE SLEEP APNEA, SEVERE (pre-PAP AHI 65.6 )  SLEEP-RELATED HYPOXEMIA  - Now on auto-CPAP 10-20 cm H2O and EPR 3  - Doing well with improved TANO symptoms.   - PAP adherence data reviewed today. Usage days 25/30, days> 4 hours at 43%, residual AHI 1.6.   - Order placed to renew PAP supplies.   - Due to pressure intolerance, recommend changing settings to auto-CPAP 8-16 cm H2O and EPR 2. Order sent to [facility]. Advised patient to continue using machine every night all night and if napping more " than 30 minutes.  - Continue supportive management as follows: Lose weight. Stay off your back when sleeping. Avoid smoking, alcohol, and sedating medications if applicable. Don't drive when sleepy.    RESTLESS LEG SYNDROME  - Consider checking ferritin and TSAT. Replace if ferritin<75.  - If above tests are normal, start Ropinirole. Side effects of iron and Ropinirole were discussed.   - Supportive management: Avoid triggers of RLS such as caffeine, alcohol, nicotine, medications, and low iron. Taper off or reduce dose of medications that can cause RLS such as but not limited ot antidepressants except Buproprion and Trazodone, 1st generation antihistamines, antipsychotics, anti-emetics except ondansetron, melatonin, topiramate, etc. Consider switch antidepressants to Bupropion if feasible. May take warm bath 2 hours before bedtime. Massage and/or stretch legs while in bed    OBESITY  - BMI 31 today  - Recommend weight loss with diet and exercise.  - Weight loss can help in long term management of TANO.  - Defer management to PCP    HYPERTENSION  - BP stable today.  - Continue anti-hypertensive medications per PCP.  - Supportive management: low salt DASH diet (less than 2000 mg sodium intake daily), moderate intensity aerobic exercise at least 30 minutes 5 days per week, reduce stress, quit smoking, limit alcohol, lose weight, and monitor BP once daily  - PCP following. Defer management to PCP      All of patient's questions were answered. He verbalizes understanding and agreement with my assessment and plan. Refer to after-visit-summary (AVS) for patient education and if applicable, for more details on sleep study preparation, troubleshooting issues with PAP usage, proper cleaning instructions of PAP supplies, and usual recommended replacement schedule for each of the PAP supplies.     Follow-up in 1-2 months after pressure change

## 2024-04-24 ENCOUNTER — OFFICE VISIT (OUTPATIENT)
Dept: CARDIOLOGY | Facility: HOSPITAL | Age: 79
End: 2024-04-24
Payer: MEDICARE

## 2024-04-24 VITALS
SYSTOLIC BLOOD PRESSURE: 153 MMHG | DIASTOLIC BLOOD PRESSURE: 83 MMHG | RESPIRATION RATE: 18 BRPM | HEIGHT: 68 IN | BODY MASS INDEX: 30.69 KG/M2 | HEART RATE: 91 BPM | WEIGHT: 202.5 LBS | OXYGEN SATURATION: 99 %

## 2024-04-24 DIAGNOSIS — I10 ESSENTIAL HYPERTENSION: ICD-10-CM

## 2024-04-24 DIAGNOSIS — E78.5 HYPERLIPIDEMIA, UNSPECIFIED HYPERLIPIDEMIA TYPE: ICD-10-CM

## 2024-04-24 DIAGNOSIS — Z95.1 HISTORY OF CORONARY ARTERY BYPASS SURGERY: Primary | ICD-10-CM

## 2024-04-24 PROBLEM — I25.10 ATHSCL HEART DISEASE OF NATIVE CORONARY ARTERY W/O ANG PCTRS: Status: RESOLVED | Noted: 2019-10-26 | Resolved: 2024-04-24

## 2024-04-24 PROBLEM — I25.2 HISTORY OF NON-ST ELEVATION MYOCARDIAL INFARCTION (NSTEMI): Status: ACTIVE | Noted: 2019-10-26

## 2024-04-24 PROCEDURE — 3077F SYST BP >= 140 MM HG: CPT | Performed by: INTERNAL MEDICINE

## 2024-04-24 PROCEDURE — 99213 OFFICE O/P EST LOW 20 MIN: CPT | Performed by: INTERNAL MEDICINE

## 2024-04-24 PROCEDURE — 93005 ELECTROCARDIOGRAM TRACING: CPT | Performed by: INTERNAL MEDICINE

## 2024-04-24 PROCEDURE — 1159F MED LIST DOCD IN RCRD: CPT | Performed by: INTERNAL MEDICINE

## 2024-04-24 PROCEDURE — 3079F DIAST BP 80-89 MM HG: CPT | Performed by: INTERNAL MEDICINE

## 2024-04-24 PROCEDURE — 1160F RVW MEDS BY RX/DR IN RCRD: CPT | Performed by: INTERNAL MEDICINE

## 2024-04-24 PROCEDURE — 1126F AMNT PAIN NOTED NONE PRSNT: CPT | Performed by: INTERNAL MEDICINE

## 2024-04-24 ASSESSMENT — ENCOUNTER SYMPTOMS
OCCASIONAL FEELINGS OF UNSTEADINESS: 1
LOSS OF SENSATION IN FEET: 0
DEPRESSION: 0

## 2024-04-24 ASSESSMENT — PAIN SCALES - GENERAL: PAINLEVEL: 0-NO PAIN

## 2024-04-24 NOTE — PROGRESS NOTES
"Chief Complaint:   No chief complaint on file.     History Of Present Illness:    Delano Aguilar is a 79 y.o. male here for followup. Hospitalized late 10/2019 with a chest pain syndrome and diagnosed with an NSTEMI with a peak troponin of 1.39 and no WMA on echocardiogram. Coronary angiography revealed multivessel disease. He is now s/p 3vCABG (LIMA-LAD, f-MALA to OM (T graft from LIMA), and left RA to the PDA). Had some post operative atrial fibrillation. He represented a few days after discharge and was diagnosed with a pulmonary embolism which was treated with AC. Had been dealing with post op pleural effusions and dyspnea which resolved with diuretics.     He reports doing relatively well from a cardiovascular standpoint. Gets exercise through walking. Notes general fatigue despite stopping his gabapentin. Otherwise offers no complaints         Last Recorded Vitals:  Vitals:    04/24/24 1153   BP: 153/83   BP Location: Left arm   Patient Position: Sitting   BP Cuff Size: Adult   Pulse: 91   Resp: 18   SpO2: 99%   Weight: 91.9 kg (202 lb 8 oz)   Height: 1.715 m (5' 7.5\")             Allergies:  Ampicillin    Outpatient Medications:  Current Outpatient Medications   Medication Instructions    alirocumab (Praluent Pen) 150 mg/mL pen injector INJECT 150 MG (1 PEN) BENEATH THE SKIN ONCE EVERY TWO WEEKS.    aspirin 81 mg EC tablet 1 tablet, oral, Daily    buPROPion XL (Wellbutrin XL) 300 mg 24 hr tablet TAKE 1 TABLET BY MOUTH EVERY DAY    escitalopram (Lexapro) 5 mg tablet TAKE 1 TABLET DAILY.    gabapentin (NEURONTIN) 300 mg, oral, Nightly    ibuprofen 600 mg, oral, Daily PRN    ketoconazole (NIZOral) 2 % cream Topical, Daily, Apply a thin a layer to affected areas twice daily    losartan (COZAAR) 50 mg, oral, Daily    metFORMIN (GLUCOPHAGE) 1,000 mg, oral, 2 times daily with meals    metoprolol succinate XL (TOPROL-XL) 25 mg, oral, Daily    pantoprazole (ProtoNix) 40 mg EC tablet TAKE ONE TABLET BY MOUTH DAILY "    topiramate (TOPAMAX) 50 mg, oral, 2 times daily    triamcinolone (Kenalog) 0.1 % cream Apply and rub in a thin film to affected areas twice daily (AM and PM)         Physical Exam:  Gen Well appearing septuagenarian male sitting up in NAD. Body mass index is 31.25 kg/m².   CV rrr. No m/r/g appreciated. No JVD or leg edema. Pulses 2+ and symmetric.    Pulm Lungs clear with normal respiratory effort.  Neuro Alert and conversant. Grossly nonfocal.       I reviewed the patient's ECG -  NSR.      I reviewed most recent imaging / labs / and office notes.    Assessment/Plan   1. CAD with NSTEMI s/p CABG  On statin and aspirin. On PCSK9-i as well. Completed 3 years beta-blockade--will discontinue given his fatigue.    2. Hypertension   BP elevated today but quite the outlier compared to his usual. Monitoring. His present regimen is to continue    3. Hyperlipidemia   Last LDL well below goal. He is to continue his present regimen.        Followup 6 mos        Navin Hinds MD

## 2024-05-02 NOTE — PATIENT INSTRUCTIONS
"Thank you for coming to the Sleep Medicine Clinic today! Your sleep medicine provider today was: Valentine Coker MD Below is a summary of your treatment plan, patient education, other important information, and our contact numbers.      TREATMENT PLAN     - Changed machine settings in clinic today. Please use your machine every night all night.   - Please read the \"Patient Education\" section below for more detailed information. Try implementing tips, reminders, strategies, and supportive management.   - If not yet done, please sign up for [employer] to make a future schedule, send prescription requests, or send messages.    Follow-up Appointment:   Follow-up in 1-2 months after pressure change    PATIENT EDUCATION     Obstructive Sleep Apnea (TANO) is a sleep disorder where your upper airway muscles relax during sleep and the airway intermittently and repetitively narrows and collapses leading to partially blocked airway (hypopnea) or completely blocked airway (apnea) which, in turn, can disrupt breathing in sleep, lower oxygen levels while you sleep and cause night time wakings. Because both apnea and hypopnea may cause higher carbon dioxide or low oxygen levels, untreated TANO can lead to heart arrhythmia, elevation of blood pressure, and make it harder for the body to consolidate memory and facilitate metabolism (leading to higher blood sugars at night). Frequent partial arousals occur during sleep resulting in sleep deprivation and daytime sleepiness. TANO is associated with an increased risk of cardiovascular disease, stroke, hypertension, and insulin resistance. Moreover, untreated TANO with excessive daytime sleepiness can increase the risk of motor vehicular accidents.    Some conservative strategies for TANO regardless of TANO severity are:   Positional therapy - Avoid sleeping on your back.   Healthy diet and regular exercise to optimize weight is highly encouraged.   Avoid alcohol late in the evening and " sedative-hypnotics as these substances can make sleep apnea worse.   Improve breathing through the nose with intranasal steroid spray, saline rinse, or antihistamines    Safety: Avoid driving vehicle and operating heavy equipment while sleepy. Drowsy driving may lead to life-threatening motor vehicle accidents. A person driving while sleepy is 5 times more likely to have an accident. If you feel sleepy, pull over and take a short power nap (sleep for less than 30 minutes). Otherwise, ask somebody to drive you.    Treatment options for sleep apnea include weight management, positional therapy, Positive Airway Therapy (PAP) therapy, oral appliance therapy, hypoglossal nerve stimulator (Inspire) and select airway surgeries.    Maintaining your CPAP/BPAP device:    The humidification chamber (aka water tank or water chamber) needs to be filled with distilled water to prevent buildup of white deposits in the future. If you cannot find distilled water, you can use tap water but expect to have white deposits buildup seen after prolonged use with tap water. If you start seeing white deposits on the water chamber, you can clean it by filling it with equal parts of distilled white vinegar and water. Let the vinegar-water mixture sit for 2 hours, and then rinse it with running tap water. Clean with soap and water then let it dry.     You should try to keep your machine clean in order to work well. Here are some tips to clean PAP supplies / accessories:    Clean the humidification chamber (aka water tank) as well as your mask and tubing at least once a week with soap and water.   Alternatively, you can fill a sink or basin with warm water and add a little mild detergent, like Ivory dish soap. Gently wipe your supplies with the soapy water to free all the oils and dirt that may have collected. Once that's done, rinse these items with clean water until the soap is gone and let them air dry. You can hang your tubing over the  curtain colby in your bathroom so that it dries.  The mask insert (part of the mask that has contact with your skin) needs to be cleaned with soap and water daily. Another option is to wipe them down with CPAP wipes or baby wipes.    You should replace your mask and tubing frequently in order to prevent bacteria buildup, machine damage, and mask seal issues. The older the mask and hose, the high likelihood that there is bacteria buildup in it especially if they are not cleaned regularly. Dirty filters damage machines because build-up of dust and contaminants can cause machine to over-heat, and in time, damage the motor of machine. Cushions lose their seal over time as most masks are made of plastic and silicone while headgear is made of neoprene. These materials will break down with age and frequent use. Here is the recommended replacement schedule for PAP supplies / accessories:    Twice a month- disposable filters and cushions for nasal mask or nasal pillows.  Once a month- cushion for full face mask  Every 3 months- mask with headgear and PAP tubing (standard or heated hose)  Every 6 months- reusable filter, water chamber, and chin strap     Other useful information:    Some people do not put water in the tank while other people prefers to put water in the tank to prevent mouth dryness. Try to experiment to determine which is more comfortable for you.   In general, new machines have 2 years warranty on parts while health insurance allows you to have a new machine once every 5 years.     Common issues with PAP machine:    Mask gets dislodged when turning to the side: Consider getting a CPAP pillow or switching to a mask with hose on top.     Dry mouth:  Your machine has built-in humidifier that heats up the air to prevent dry mouth. It can be adjusted to your comfort. You can try that first and increase setting one level one night at a time to check which setting is comfortable and effective in lessening dry mouth.  "In some patients with heated hose, adjusting tube temperature to make air warmer can improve dry mouth. If dry mouth persists despite adjusting humidity or tube temperature setting, may apply OTC Biotene gel over the gums at bedtime.  If Biotene gel is not effective, consider trying XEROSTOM gel from [employer].  Also, eliminate or reduce dose of medications that can cause dry mouth if possible. Lastly, may try getting a separate room humidifier machine.    Airleaks: Please call DME as they may need to adjust your mask or refit you with a different kind or different size of mask. In addition, you can ask DME for tips on getting a good mask seal and mask fit.     Difficulty tolerating the mask: Contact your DME to try a different kind of mask and/or call office to get a referral to Sleep Psychologist for CPAP desensitization. CPAP desensitization technique is a set of strategies that helps patient cope with claustrophobia and anxiety related to wearing mask. Alternatively, we can do a daytime mini-sleep study called PAP-nap trial wherein you will try on different kinds of mask and the sleep technician will try different pressure settings on CPAP and BPAP machines to see which specific pressure is tolerable and comfortable for you.     Water droplets or moisture within the hose and/or mask: This is called rain-out and it is caused by condensation of too much heated humidity on the cooler walls of the hose. If you have rain-out, turn down humidity settings or get a heated hose. If you already have a heated hose, turn up the \"tube temperature\" of the heated hose. Alternatively, if you don't want to get a heated hose or warmer air, may wrap the CPAP hose with stockings to keep it somewhat warm. Also, you need to place the machine on the floor and lower the hose so that water won't travel upward towards your mask.     You can also go to the following EDUCATION WEBSITES for further information:   American Academy of Sleep " Medicine http://sleepeducation.org  National Sleep Foundation: https://sleepfoundation.org  American Sleep Apnea Association: https://www.sleepapnea.org (for patients with sleep apnea)  Narcolepsy Network: https://www.narcolepsynetwork.org (for patients with narcolepsy)  [employer]: https://www.[employer].org (for patients with narcolepsy)  Hypersomnia Foundation: https://www.hypersomniafoundation.org (for patients with idiopathic hypersomnia)  RLS foundation: https://www.rls.org (for patients with restless leg syndrome)    IMPORTANT INFORMATION     Call 911 for medical emergencies.  Our offices are generally open from Monday-Friday, 8 am - 5 pm.   There are no supporting services by either the sleep doctors or their staff on weekends and Holidays, or after 5 PM on weekdays.   If you need to get in touch with me, you may either call my office number or you can use [employer].  If a referral for a test, for CPAP, or for another specialist was made, and you have not heard about scheduling this within a week, please call scheduling at [phone] ([phone]).  If you are unable to make your appointment for clinic or an overnight study, kindly call the office or sleep testing center at least 48 hours in advance to cancel and reschedule.  If you are on CPAP, please bring your device's card and/or the device to each clinic appointment.   In case of problems with PAP machine or mask interface, please contact your [employer] ([employer]urable Medical [employer]) company first. [employer] is the company who provides you the machine and/or PAP supplies.       PRESCRIPTIONS     We require 7 days advanced notice for prescription refills. If we do not receive the request in this time, we cannot guarantee that your medication will be refilled in time.    IMPORTANT PHONE NUMBERS     Sleep Medicine Clinic Fax: [phone]  Appointments (for Pediatric Sleep Clinic): [phone] - option 1  Appointments (for Adult Sleep Clinic):  [phone] - option 2  Appointments (For Sleep Studies): [phone] - option 3  Behavioral Sleep Medicine: [phone]  Sleep Surgery: [phone]  Nutrition Service: [phone]  Weight management clinics with endocrinology: [phone]  Bariatric Services: [phone] (includes weight loss medications and weight loss surgery)  [facility]: [phone] (offers holistic approaches to weight management)  ENT (Otolaryngology): [phone]  Headache Clinic (Neurology): [phone]  Neurology: [phone]  Psychiatry: [phone]  Pulmonary Function Testing (PFT) Center: [phone]  Pulmonary Medicine: [phone]  [employer] ([employer]): [phone]      OUR SLEEP TESTING LOCATIONS     Our team will contact you to schedule your sleep study, however, you can contact us as follow:  Main Phone Line (scheduling only): [phone]), option 3  Adult and Pediatric Locations  [facility] (6 years and older): [facility] - 4th floor ([facility]) After hours line: [phone]  [facility] (Main campus: All ages): [facility], 6th floor. After hours line: [phone]   [place] (5 years and older; younger considered on case-by-case basis): [address]; [address], [address]. Scheduling  After hours line: [phone]   [facility] (6 years and older): [address]; [address]; [address]   [place] (6 years and older): [address], [address]  After hours line: [phone]   [place] (13 years and older) in [place]: [address], 2nd floor  After hours line: [phone]   [place] (13 year and older): [address]  After hours line: [phone]     Adult Only Locations:   [place] (18 years and older): [address], 2nd floor   [place] (18 years and older): [address]; 4th floor  After hours line:  [phone]   [place] (18 years and older) at [place]: [address] [facility]  After hours line: [phone]     CONTACTING YOUR SLEEP MEDICINE PROVIDER AND SLEEP TEAM      For issues with your machine or mask interface, please call your DME provider first. [employer] stands for durable medical company. [employer] is the company who provides you the machine and/or PAP supplies / accessories.   To schedule, cancel, or reschedule SLEEP STUDY APPOINTMENTS, please call the Main Phone Line at [phone] - option 3.   To schedule, cancel, or reschedule CLINIC APPOINTMENTS, you can do it in \"MyChart\", call [phone] to speak with my  (Cally Perez), or call the Main Phone Line at [phone] - option 2  For CLINICAL QUESTIONS or MEDICATION REFILLS, please call direct line for Adult Sleep Nurses at [phone].   Lastly, you can also send a message directly to your provider through \"My Chart\", which is the email service through your  Records Account: [email or web address]       Here at [facility], we wish you a restful sleep!    "

## 2024-05-03 ENCOUNTER — SPECIALTY PHARMACY (OUTPATIENT)
Dept: PHARMACY | Facility: CLINIC | Age: 79
End: 2024-05-03

## 2024-05-03 PROCEDURE — RXMED WILLOW AMBULATORY MEDICATION CHARGE

## 2024-05-04 LAB
ATRIAL RATE: 91 BPM
P AXIS: 27 DEGREES
P OFFSET: 193 MS
P ONSET: 127 MS
PR INTERVAL: 192 MS
Q ONSET: 223 MS
QRS COUNT: 15 BEATS
QRS DURATION: 84 MS
QT INTERVAL: 328 MS
QTC CALCULATION(BAZETT): 403 MS
QTC FREDERICIA: 377 MS
R AXIS: 37 DEGREES
T AXIS: 46 DEGREES
T OFFSET: 387 MS
VENTRICULAR RATE: 91 BPM

## 2024-05-09 ENCOUNTER — PHARMACY VISIT (OUTPATIENT)
Dept: PHARMACY | Facility: CLINIC | Age: 79
End: 2024-05-09
Payer: MEDICARE

## 2024-06-19 ENCOUNTER — OFFICE VISIT (OUTPATIENT)
Dept: SLEEP MEDICINE | Facility: CLINIC | Age: 79
End: 2024-06-19
Payer: MEDICARE

## 2024-06-19 VITALS
BODY MASS INDEX: 29.7 KG/M2 | HEART RATE: 95 BPM | RESPIRATION RATE: 18 BRPM | HEIGHT: 68 IN | OXYGEN SATURATION: 96 % | WEIGHT: 196 LBS | DIASTOLIC BLOOD PRESSURE: 80 MMHG | SYSTOLIC BLOOD PRESSURE: 118 MMHG

## 2024-06-19 DIAGNOSIS — E66.9 OBESITY (BMI 30-39.9): ICD-10-CM

## 2024-06-19 DIAGNOSIS — G47.33 OSA ON CPAP: Primary | ICD-10-CM

## 2024-06-19 DIAGNOSIS — I10 BENIGN ESSENTIAL HYPERTENSION: ICD-10-CM

## 2024-06-19 PROCEDURE — G2211 COMPLEX E/M VISIT ADD ON: HCPCS | Performed by: INTERNAL MEDICINE

## 2024-06-19 PROCEDURE — 99214 OFFICE O/P EST MOD 30 MIN: CPT | Performed by: INTERNAL MEDICINE

## 2024-06-19 PROCEDURE — 3074F SYST BP LT 130 MM HG: CPT | Performed by: INTERNAL MEDICINE

## 2024-06-19 PROCEDURE — 1160F RVW MEDS BY RX/DR IN RCRD: CPT | Performed by: INTERNAL MEDICINE

## 2024-06-19 PROCEDURE — 1125F AMNT PAIN NOTED PAIN PRSNT: CPT | Performed by: INTERNAL MEDICINE

## 2024-06-19 PROCEDURE — 1159F MED LIST DOCD IN RCRD: CPT | Performed by: INTERNAL MEDICINE

## 2024-06-19 PROCEDURE — 3079F DIAST BP 80-89 MM HG: CPT | Performed by: INTERNAL MEDICINE

## 2024-06-19 ASSESSMENT — ENCOUNTER SYMPTOMS
DEPRESSION: 0
OCCASIONAL FEELINGS OF UNSTEADINESS: 1
LOSS OF SENSATION IN FEET: 0

## 2024-06-19 ASSESSMENT — SLEEP AND FATIGUE QUESTIONNAIRES
HOW LIKELY ARE YOU TO NOD OFF OR FALL ASLEEP IN A CAR, WHILE STOPPED FOR A FEW MINUTES IN TRAFFIC: WOULD NEVER DOZE
HOW LIKELY ARE YOU TO NOD OFF OR FALL ASLEEP WHILE SITTING AND READING: SLIGHT CHANCE OF DOZING
HOW LIKELY ARE YOU TO NOD OFF OR FALL ASLEEP WHILE SITTING QUIETLY AFTER LUNCH WITHOUT ALCOHOL: WOULD NEVER DOZE
SITING INACTIVE IN A PUBLIC PLACE LIKE A CLASS ROOM OR A MOVIE THEATER: WOULD NEVER DOZE
HOW LIKELY ARE YOU TO NOD OFF OR FALL ASLEEP WHILE WATCHING TV: WOULD NEVER DOZE
HOW LIKELY ARE YOU TO NOD OFF OR FALL ASLEEP WHILE LYING DOWN TO REST IN THE AFTERNOON WHEN CIRCUMSTANCES PERMIT: MODERATE CHANCE OF DOZING
HOW LIKELY ARE YOU TO NOD OFF OR FALL ASLEEP WHEN YOU ARE A PASSENGER IN A CAR FOR AN HOUR WITHOUT A BREAK: WOULD NEVER DOZE
HOW LIKELY ARE YOU TO NOD OFF OR FALL ASLEEP WHILE SITTING AND TALKING TO SOMEONE: WOULD NEVER DOZE
ESS-CHAD TOTAL SCORE: 3

## 2024-06-19 ASSESSMENT — PAIN SCALES - GENERAL: PAINLEVEL: 2

## 2024-06-19 ASSESSMENT — PATIENT HEALTH QUESTIONNAIRE - PHQ9
SUM OF ALL RESPONSES TO PHQ9 QUESTIONS 1 AND 2: 0
2. FEELING DOWN, DEPRESSED OR HOPELESS: NOT AT ALL
1. LITTLE INTEREST OR PLEASURE IN DOING THINGS: NOT AT ALL

## 2024-06-19 NOTE — PROGRESS NOTES
"[facility] SLEEP MED[facility] CLIN[facility]  FOLLOW-UP VISIT NOTE      PCP: Isaac Gallegos DO    HISTORY OF PRESENT ILLNESS     Patient ID: Delano Aguilar \"Christal" is a 79 y.o. male who presents for follow-up of TANO on PAP after pressure change.     Patient is here alone today.  To review, patient's medical history is notable for obesity (BMI 30), hypertension, GERD, PE, CAD, status post CABG, and TANO with CPAP intolerance.     Interval History  Patient was last seen in 4/17/2024. Plan was to change machine settings from auto-CPAP 10-20 cm H2O to auto-CPAP 8-16 cm H2O.  Since last visit, patient has been using machine every night. Current mask interface being used is nasal pillows mask. Per records, patient is getting supplies thru [employer]  Patient denies machine problems, air hunger, aerophagia, dry mouth, skin irritation, and nasal congestion.   Complains of mask leak and taking off mask without recall.  The following are patient's perceived benefits of PAP: no snoring on PAP, refreshing sleep, and decreased daytime sleepiness and/or fatigue    RLS Follow-up:   Used to have it every night during last visit     SLEEP STUDY HISTORY (personally reviewed raw data such as interpretation report, data sheet, hypnogram, and titration table if available and applicable)  Split-night PSG 9/22/2021: pre-PAP AHI 65.6, supine , REM AHI N/A, SpO2 yovana 83%, spent 12.1 minutes with SpO2<89%. CPAP was started at 4-12 cm H2O. No REM supine sleep during titration. At CPAP 10 cm H2O, AHI 3.1, SpO2 yovana 90%. PLMS idnex during diagnostic portion was 15.4. PLMS index during titration was 20.1.     SLEEP-WAKE SCHEDULE  Bedtime:  12 MN daily  Subjective sleep latency: 5-10 minutes  Difficulty falling asleep: No  Number of awakenings: none. Patient sleeps thru the night.  Final wake time:  10 AM daily  Out of bed time:  10 AM daily  Shift work: No   Naps: once daily for 1.5 to 2 hours  Feels rested after a nap: No " (not using CPAP during naps)  Average sleep duration (excluding naps): 7 hours    SLEEP ENVIRONMENT  Sleep location: bed  Sleep status: sleeps with pet dog . Wife sleeps in a separate bedroom  Preferred sleep position: side    SOCIAL HISTORY  Smokin cigar every 2 weeks  ETOH: 1 glass wine once a week  Marijuana: no  Caffeine: 2 cups coffee daily  Sleep aids: no but takes gabapentin for back pain    WEIGHT: lost 8 lbs in 2 months     Claustrophobia: No     REVIEW OF SYSTEMS     All other systems have been reviewed and are negative.    ALLERGIES     Allergies   Allergen Reactions    Ampicillin Unknown     puffiness occurred.       MEDICATIONS     Current Outpatient Medications   Medication Sig Dispense Refill    alirocumab (Praluent Pen) 150 mg/mL pen injector INJECT 150 MG (1 PEN) BENEATH THE SKIN ONCE EVERY TWO WEEKS. 6 mL 3    aspirin 81 mg EC tablet Take 1 tablet (81 mg) by mouth once daily.      buPROPion XL (Wellbutrin XL) 300 mg 24 hr tablet TAKE 1 TABLET BY MOUTH EVERY DAY 90 tablet 1    escitalopram (Lexapro) 5 mg tablet TAKE 1 TABLET DAILY. 90 tablet 1    ketoconazole (NIZOral) 2 % cream Apply topically once daily. Apply a thin a layer to affected areas twice daily 60 g 0    losartan (Cozaar) 50 mg tablet TAKE 1 TABLET BY MOUTH DAILY 90 tablet 3    metFORMIN (Glucophage) 1,000 mg tablet Take 1 tablet (1,000 mg) by mouth 2 times a day with meals. 180 tablet 1    pantoprazole (ProtoNix) 40 mg EC tablet TAKE ONE TABLET BY MOUTH DAILY 90 tablet 1    topiramate (Topamax) 50 mg tablet Take 1 tablet (50 mg) by mouth 2 times a day. 180 tablet 1    triamcinolone (Kenalog) 0.1 % cream Apply and rub in a thin film to affected areas twice daily (AM and PM)      gabapentin (Neurontin) 300 mg capsule Take 1 capsule (300 mg) by mouth once daily at bedtime. (Patient not taking: Reported on 2024) 90 capsule 1    ibuprofen 600 mg tablet Take 1 tablet (600 mg) by mouth once daily as needed for mild pain (1 - 3). 30  "tablet 0     No current facility-administered medications for this visit.       Active Problems, Allergy List, Medication List, and PMH/PSH/FH/Social Hx have been reviewed and reconciled in chart. No significant changes unless documented in the pertinent chart section. Updates made when necessary.       PHYSICAL EXAM     VITAL SIGNS: /80   Pulse 95   Resp 18   Ht 1.715 m (5' 7.5\")   Wt 88.9 kg (196 lb)   SpO2 96%   BMI 30.24 kg/m²     NECK CIRCUMFERENCE: 18.5 inches    Today ESS: 3  Last visit ESS: 5  LYN: 13    Physical Exam  Constitutional: Awake, not in distress  Skin: Warm, no rash  Neuro: No tremors, moves all extremities  Psych: alert and oriented to time, place, and person    RESULTS/DATA     No results found for: \"IRON\", \"TRANSFERRIN\", \"IRONSAT\", \"TIBC\", \"FERRITIN\"    Bicarbonate   Date Value Ref Range Status   06/02/2023 26 21 - 32 mmol/L Final       PAP Adherence  A PAP adherence data was obtained and reviewed personally today in clinic. (see scanned document in EPIC)      ASSESSMENT/PLAN     Delano Aguilar \"Saulo\" is a 79 y.o. male who returns in [facility] for the following problems:    OBSTRUCTIVE SLEEP APNEA, SEVERE (pre-PAP AHI 65.6)  SLEEP-RELATED HYPOXEMIA  - Now on auto-CPAP 8-16 cm H2O and EPR 3  - Doing well with improved TANO symptoms.   - PAP adherence data reviewed today. Usage days 27/30, days> 4 hours at 47%, residual AHI 1.   - Due to mask leak and ripping off mask, recommend changing settings to auto-CPAP 6-14 cm H2O. Order sent to [facility]. Advised patient to continue using machine every night all night and if napping more than 30 minutes.  - Continue supportive management as follows: Lose weight. Stay off your back when sleeping. Avoid smoking, alcohol, and sedating medications if applicable. Don't drive when sleepy.    RESTLESS LEG SYNDROME  - Consider checking ferritin and TSAT. Replace if ferritin<75.  - If above tests are " normal, start Ropinirole. Side effects of iron and Ropinirole were discussed.   - Supportive management: Avoid triggers of RLS such as caffeine, alcohol, nicotine, medications, and low iron. Taper off or reduce dose of medications that can cause RLS such as but not limited ot antidepressants except Buproprion and Trazodone, 1st generation antihistamines, antipsychotics, anti-emetics except ondansetron, melatonin, topiramate, etc. Consider switch antidepressants to Bupropion if feasible. May take warm bath 2 hours before bedtime. Massage and/or stretch legs while in bed    OBESITY   - Recommend weight loss with diet and exercise.  - Weight loss can help in long term management of TANO.  - Defer management to PCP.    HYPERTENSION  - BP stable today.  - Continue anti-hypertensive medications per PCP.  - Supportive management: low salt DASH diet (less than 2000 mg sodium intake daily), moderate intensity aerobic exercise at least 30 minutes 5 days per week, reduce stress, quit smoking, limit alcohol, lose weight, and monitor BP once daily  - PCP following. Defer management to PCP.        Follow-up in 3 months    All of patient's questions were answered. He verbalizes understanding and agreement with my assessment and plan. Refer to after-visit-summary (AVS) for patient education and if applicable, for more details on sleep study preparation, troubleshooting issues with PAP usage, proper cleaning instructions of PAP supplies, and usual recommended replacement schedule for each of the PAP supplies.

## 2024-07-26 NOTE — PROGRESS NOTES
Subjective   Patient ID: Saulo Aguilar is a 79 y.o. male who presents for Medicare Annual Wellness Visit Subsequent.    HPI     Patient has lost 25-30 lbs in the last 6 months or so with diet/exercise . Takes metformin and topiramate.    Reports urinary incontinence. Nocturia 0-1x nightly. No urinary hesitancy or slow stream.     Denies any other new issues today         Review of Systems   All other systems reviewed and are negative.      Objective   There were no vitals taken for this visit.    Physical Exam  Constitutional:       Appearance: Normal appearance.   HENT:      Head: Normocephalic and atraumatic.   Cardiovascular:      Rate and Rhythm: Normal rate and regular rhythm.      Heart sounds: Normal heart sounds. No murmur heard.     No gallop.   Pulmonary:      Effort: Pulmonary effort is normal. No respiratory distress.      Breath sounds: No wheezing or rales.   Skin:     General: Skin is warm and dry.      Findings: No rash.   Neurological:      Mental Status: He is alert and oriented to person, place, and time. Mental status is at baseline.   Psychiatric:         Mood and Affect: Mood normal.         Behavior: Behavior normal.         Assessment/Plan       #Depression   -Stable. Cont bupropion XL 300mg daily and escitalopram 5mg daily      #Obesity  -Down 25-30 lbs lbs since April. Cont metformin 1000mg BID   -Change topiramate to 100mg nightly due to fatigue during day     #Lumbar radiculopathy, neck pain   -s/p L1-L2 laminectomy. Follows with pain management       #CAD s/p CABG, HLD  -CABG x3 11/2019 2/2 NSTEMI. Follows with cardiology. Continue ASA Praluent-per cardiology, goal <55  -Order CBC, CMP and lipids      #HTN  -Continue losartan 50mg daily, goal <130/80     #TANO  -Reports compliance with CPAP      Colonoscopy: 3/2022  PSA: 6/15/20     RTC 6 mo

## 2024-07-29 ENCOUNTER — APPOINTMENT (OUTPATIENT)
Dept: PRIMARY CARE | Facility: CLINIC | Age: 79
End: 2024-07-29
Payer: MEDICARE

## 2024-07-29 VITALS
HEART RATE: 88 BPM | OXYGEN SATURATION: 95 % | DIASTOLIC BLOOD PRESSURE: 73 MMHG | WEIGHT: 195 LBS | HEIGHT: 68 IN | SYSTOLIC BLOOD PRESSURE: 114 MMHG | BODY MASS INDEX: 29.55 KG/M2

## 2024-07-29 DIAGNOSIS — C91.41 HAIRY CELL LEUKEMIA, IN REMISSION (MULTI): ICD-10-CM

## 2024-07-29 DIAGNOSIS — Z00.00 ROUTINE GENERAL MEDICAL EXAMINATION AT HEALTH CARE FACILITY: Primary | ICD-10-CM

## 2024-07-29 DIAGNOSIS — M46.1 SACROILIITIS (CMS-HCC): ICD-10-CM

## 2024-07-29 DIAGNOSIS — I97.89 POSTOPERATIVE ATRIAL FIBRILLATION (MULTI): ICD-10-CM

## 2024-07-29 DIAGNOSIS — I26.99 OTHER PULMONARY EMBOLISM WITHOUT ACUTE COR PULMONALE, UNSPECIFIED CHRONICITY (MULTI): ICD-10-CM

## 2024-07-29 DIAGNOSIS — E66.09 CLASS 1 OBESITY DUE TO EXCESS CALORIES WITHOUT SERIOUS COMORBIDITY WITH BODY MASS INDEX (BMI) OF 34.0 TO 34.9 IN ADULT: ICD-10-CM

## 2024-07-29 DIAGNOSIS — I50.32 CHRONIC DIASTOLIC HEART FAILURE (MULTI): ICD-10-CM

## 2024-07-29 DIAGNOSIS — I48.91 POSTOPERATIVE ATRIAL FIBRILLATION (MULTI): ICD-10-CM

## 2024-07-29 DIAGNOSIS — F32.0 MDD (MAJOR DEPRESSIVE DISORDER), SINGLE EPISODE, MILD (CMS-HCC): ICD-10-CM

## 2024-07-29 DIAGNOSIS — I10 ESSENTIAL HYPERTENSION: ICD-10-CM

## 2024-07-29 DIAGNOSIS — E78.5 HYPERLIPIDEMIA, UNSPECIFIED HYPERLIPIDEMIA TYPE: ICD-10-CM

## 2024-07-29 PROCEDURE — 1159F MED LIST DOCD IN RCRD: CPT | Performed by: INTERNAL MEDICINE

## 2024-07-29 PROCEDURE — 99214 OFFICE O/P EST MOD 30 MIN: CPT | Performed by: INTERNAL MEDICINE

## 2024-07-29 PROCEDURE — 3074F SYST BP LT 130 MM HG: CPT | Performed by: INTERNAL MEDICINE

## 2024-07-29 PROCEDURE — G0439 PPPS, SUBSEQ VISIT: HCPCS | Performed by: INTERNAL MEDICINE

## 2024-07-29 PROCEDURE — 1160F RVW MEDS BY RX/DR IN RCRD: CPT | Performed by: INTERNAL MEDICINE

## 2024-07-29 PROCEDURE — 1170F FXNL STATUS ASSESSED: CPT | Performed by: INTERNAL MEDICINE

## 2024-07-29 PROCEDURE — 3078F DIAST BP <80 MM HG: CPT | Performed by: INTERNAL MEDICINE

## 2024-07-29 RX ORDER — TOPIRAMATE 100 MG/1
100 TABLET, FILM COATED ORAL NIGHTLY
Start: 2024-07-29 | End: 2025-01-25

## 2024-07-29 ASSESSMENT — ACTIVITIES OF DAILY LIVING (ADL)
BATHING: INDEPENDENT
GROCERY_SHOPPING: INDEPENDENT
TAKING_MEDICATION: INDEPENDENT
DOING_HOUSEWORK: INDEPENDENT
DRESSING: INDEPENDENT
MANAGING_FINANCES: INDEPENDENT

## 2024-07-29 NOTE — PATIENT INSTRUCTIONS
Please complete fasting blood work. Fast for 10 hours, black coffee and water the morning of labs are OK.     6 months

## 2024-08-01 ENCOUNTER — SPECIALTY PHARMACY (OUTPATIENT)
Dept: PHARMACY | Facility: CLINIC | Age: 79
End: 2024-08-01

## 2024-08-02 ENCOUNTER — LAB (OUTPATIENT)
Dept: LAB | Facility: LAB | Age: 79
End: 2024-08-02
Payer: MEDICARE

## 2024-08-02 DIAGNOSIS — R25.1 TREMOR: ICD-10-CM

## 2024-08-02 DIAGNOSIS — E66.09 CLASS 1 OBESITY DUE TO EXCESS CALORIES WITHOUT SERIOUS COMORBIDITY WITH BODY MASS INDEX (BMI) OF 34.0 TO 34.9 IN ADULT: ICD-10-CM

## 2024-08-02 DIAGNOSIS — E78.5 HYPERLIPIDEMIA, UNSPECIFIED HYPERLIPIDEMIA TYPE: ICD-10-CM

## 2024-08-02 LAB
ALBUMIN SERPL BCP-MCNC: 4.4 G/DL (ref 3.4–5)
ALP SERPL-CCNC: 76 U/L (ref 33–136)
ALT SERPL W P-5'-P-CCNC: 17 U/L (ref 10–52)
ANION GAP SERPL CALC-SCNC: 14 MMOL/L (ref 10–20)
AST SERPL W P-5'-P-CCNC: 18 U/L (ref 9–39)
BILIRUB SERPL-MCNC: 0.4 MG/DL (ref 0–1.2)
BUN SERPL-MCNC: 20 MG/DL (ref 6–23)
CALCIUM SERPL-MCNC: 9.6 MG/DL (ref 8.6–10.6)
CHLORIDE SERPL-SCNC: 109 MMOL/L (ref 98–107)
CHOLEST SERPL-MCNC: 133 MG/DL (ref 0–199)
CHOLESTEROL/HDL RATIO: 3
CO2 SERPL-SCNC: 21 MMOL/L (ref 21–32)
CREAT SERPL-MCNC: 0.77 MG/DL (ref 0.5–1.3)
EGFRCR SERPLBLD CKD-EPI 2021: >90 ML/MIN/1.73M*2
ERYTHROCYTE [DISTWIDTH] IN BLOOD BY AUTOMATED COUNT: 13.9 % (ref 11.5–14.5)
EST. AVERAGE GLUCOSE BLD GHB EST-MCNC: 117 MG/DL
GLUCOSE SERPL-MCNC: 98 MG/DL (ref 74–99)
HBA1C MFR BLD: 5.7 %
HCT VFR BLD AUTO: 45.5 % (ref 41–52)
HDLC SERPL-MCNC: 45 MG/DL
HGB BLD-MCNC: 15.2 G/DL (ref 13.5–17.5)
LDLC SERPL CALC-MCNC: 48 MG/DL
MCH RBC QN AUTO: 32.6 PG (ref 26–34)
MCHC RBC AUTO-ENTMCNC: 33.4 G/DL (ref 32–36)
MCV RBC AUTO: 98 FL (ref 80–100)
NON HDL CHOLESTEROL: 88 MG/DL (ref 0–149)
NRBC BLD-RTO: 0 /100 WBCS (ref 0–0)
PLATELET # BLD AUTO: 169 X10*3/UL (ref 150–450)
POTASSIUM SERPL-SCNC: 4.4 MMOL/L (ref 3.5–5.3)
PROT SERPL-MCNC: 7 G/DL (ref 6.4–8.2)
RBC # BLD AUTO: 4.66 X10*6/UL (ref 4.5–5.9)
SODIUM SERPL-SCNC: 140 MMOL/L (ref 136–145)
TRIGL SERPL-MCNC: 202 MG/DL (ref 0–149)
TSH SERPL-ACNC: 1.67 MIU/L (ref 0.44–3.98)
VLDL: 40 MG/DL (ref 0–40)
WBC # BLD AUTO: 5.1 X10*3/UL (ref 4.4–11.3)

## 2024-08-02 PROCEDURE — 36415 COLL VENOUS BLD VENIPUNCTURE: CPT

## 2024-08-23 DIAGNOSIS — R21 RASH: ICD-10-CM

## 2024-08-26 ENCOUNTER — TELEPHONE (OUTPATIENT)
Dept: PRIMARY CARE | Facility: CLINIC | Age: 79
End: 2024-08-26
Payer: MEDICARE

## 2024-08-26 PROCEDURE — RXMED WILLOW AMBULATORY MEDICATION CHARGE

## 2024-08-26 RX ORDER — KETOCONAZOLE 20 MG/G
CREAM TOPICAL
Qty: 60 G | Refills: 1 | Status: SHIPPED | OUTPATIENT
Start: 2024-08-26

## 2024-08-26 NOTE — TELEPHONE ENCOUNTER
Saulo called stating he got scratched by a dog x 4 days ago, on the outside of the hand. He has been cleaning it with alcohol. The redness isn't going away looks like it could be infected he states

## 2024-08-27 ENCOUNTER — PHARMACY VISIT (OUTPATIENT)
Dept: PHARMACY | Facility: CLINIC | Age: 79
End: 2024-08-27
Payer: MEDICARE

## 2024-08-29 ENCOUNTER — TELEMEDICINE CLINICAL SUPPORT (OUTPATIENT)
Dept: PHARMACY | Facility: HOSPITAL | Age: 79
End: 2024-08-29
Payer: MEDICARE

## 2024-08-29 NOTE — PROGRESS NOTES
"[facility] Clinical Note    Delano Aguilar \"Saulo\" is a 79 y.o. male, who is on the specialty pharmacy service for management of:  Hyperlipidemia Core.    Delano Aguilar \"Saulo\" is taking: Praluent 150mg under the skin every 2 weeks.    Delano was contacted on 8/29/2024 at 10:21 AM for a virtual pharmacy visit with encounter number [id number] from:   [facility]  [address]  [address]  [facility] [address]  Dept: [phone]  Dept Fax: [phone]  Loc: [phone]    Dleano was offered a Telemedicine Video visit or Telephone visit.  Delano consented to a telephone visit, which was performed.    The most recent encounter visit with the referring prescriber Navin Hinds MD on 4/24/24 was reviewed.  Pharmacy will continue to collaborate in the care of this patient with the referring prescriber Navin Hinds MD.    General Assessment  Saulo continues on Praluent as prescribed with no reported side effects, efficacy concerns, or ongoing adherence barriers. He just recently had a delay in refilling due to lapse in copay assistance which has since been renewed and refilled. This resulted in 1 missed dose of Praluent. His most recent lipid panel shows LDL at goal.    Impression/Plan  Is patient high risk (potential patients:  pregnancy, geriatric, pediatric)?  Yes- geriatric with CAD with NSTEMI s/p CABG, HTN, HLD  Is laboratory follow-up needed? Not at this time  Is a clinical intervention needed? No  Next reassessment date? Approx. 6 months    Refer to the encounter summary report for documentation details about patient counseling and education.      Medication Adherence    The importance of adherence was discussed with the patient and they were advised to take the medication as prescribed by their provider. Patient was encouraged to call their physician's office if they have a question regarding a missed dose. "     QOL/Patient Satisfaction  Rate your quality of life on scale of 1-10: -- (Not assessed)  Rate your satisfaction with  Specialty Pharmacy on scale of 1-10: -- (Not assessed)      Patient was advised to contact the pharmacy if there are any changes to their medication list, including prescriptions, OTC medications, herbal products, or supplements. Patient was advised of [facility]'s dispensing process, refill timeline, contact information ([phone]), and patient management follow up. Patient confirmed understanding of education conducted during assessment. All patient questions and concerns were addressed to the best of my ability. Patient was encouraged to contact the specialty pharmacy with any questions or concerns.    Confirmed follow-up outreaches are properly scheduled and reviewed goals of therapy with the patient.        Elisha Ellis, PharmD

## 2024-09-05 DIAGNOSIS — K21.9 GASTRO-ESOPHAGEAL REFLUX DISEASE WITHOUT ESOPHAGITIS: ICD-10-CM

## 2024-09-06 RX ORDER — PANTOPRAZOLE SODIUM 40 MG/1
TABLET, DELAYED RELEASE ORAL
Qty: 90 TABLET | Refills: 1 | Status: SHIPPED | OUTPATIENT
Start: 2024-09-06

## 2024-09-11 ENCOUNTER — APPOINTMENT (OUTPATIENT)
Dept: SLEEP MEDICINE | Facility: CLINIC | Age: 79
End: 2024-09-11
Payer: MEDICARE

## 2024-09-12 DIAGNOSIS — E66.09 CLASS 1 OBESITY DUE TO EXCESS CALORIES WITHOUT SERIOUS COMORBIDITY WITH BODY MASS INDEX (BMI) OF 34.0 TO 34.9 IN ADULT: ICD-10-CM

## 2024-09-14 RX ORDER — TOPIRAMATE 50 MG/1
50 TABLET, FILM COATED ORAL 2 TIMES DAILY
Qty: 180 TABLET | Refills: 1 | OUTPATIENT
Start: 2024-09-14 | End: 2025-03-13

## 2024-09-18 RX ORDER — TOPIRAMATE 100 MG/1
100 TABLET, FILM COATED ORAL NIGHTLY
Qty: 30 TABLET | Refills: 5 | Status: SHIPPED | OUTPATIENT
Start: 2024-09-18 | End: 2025-03-17

## 2024-09-20 ENCOUNTER — OFFICE VISIT (OUTPATIENT)
Dept: PRIMARY CARE | Facility: CLINIC | Age: 79
End: 2024-09-20
Payer: MEDICARE

## 2024-09-20 ENCOUNTER — TELEPHONE (OUTPATIENT)
Dept: PRIMARY CARE | Facility: CLINIC | Age: 79
End: 2024-09-20

## 2024-09-20 VITALS
DIASTOLIC BLOOD PRESSURE: 82 MMHG | OXYGEN SATURATION: 95 % | WEIGHT: 196 LBS | SYSTOLIC BLOOD PRESSURE: 130 MMHG | HEIGHT: 68 IN | HEART RATE: 86 BPM | BODY MASS INDEX: 29.7 KG/M2

## 2024-09-20 DIAGNOSIS — M25.562 ACUTE PAIN OF LEFT KNEE: Primary | ICD-10-CM

## 2024-09-20 PROCEDURE — 1159F MED LIST DOCD IN RCRD: CPT | Performed by: INTERNAL MEDICINE

## 2024-09-20 PROCEDURE — 1123F ACP DISCUSS/DSCN MKR DOCD: CPT | Performed by: INTERNAL MEDICINE

## 2024-09-20 PROCEDURE — 99213 OFFICE O/P EST LOW 20 MIN: CPT | Performed by: INTERNAL MEDICINE

## 2024-09-20 PROCEDURE — 3079F DIAST BP 80-89 MM HG: CPT | Performed by: INTERNAL MEDICINE

## 2024-09-20 PROCEDURE — 3075F SYST BP GE 130 - 139MM HG: CPT | Performed by: INTERNAL MEDICINE

## 2024-09-20 PROCEDURE — 1158F ADVNC CARE PLAN TLK DOCD: CPT | Performed by: INTERNAL MEDICINE

## 2024-09-20 RX ORDER — PREDNISONE 20 MG/1
20 TABLET ORAL 2 TIMES DAILY
Qty: 10 TABLET | Refills: 0 | Status: SHIPPED | OUTPATIENT
Start: 2024-09-20 | End: 2024-09-25

## 2024-09-20 NOTE — PROGRESS NOTES
Subjective   Patient ID: Saulo Aguilar is a 79 y.o. male who presents for Knee Pain (Left knee pain x 1.5 months, states it only hurts while sleeping ).    Knee Pain      Reports left knee pain x 1.5 months. Hurts more when he sleeps. APAP helps. Walking helps. He has a knee scope many years ago on the same knee (Ligament operation)        Review of Systems   All other systems reviewed and are negative.      Objective   There were no vitals taken for this visit.    Physical Exam  Musculoskeletal:      Comments: Left knee full ROM, normal gait          Assessment/Plan       #Left knee pain   -Rx pred 20mg Bid x 5 days   -Refer to ortho

## 2024-09-25 DIAGNOSIS — E78.5 HYPERLIPIDEMIA LDL GOAL <70: ICD-10-CM

## 2024-09-25 DIAGNOSIS — I25.10 ASCVD (ARTERIOSCLEROTIC CARDIOVASCULAR DISEASE): ICD-10-CM

## 2024-09-25 RX ORDER — ALIROCUMAB 150 MG/ML
INJECTION, SOLUTION SUBCUTANEOUS
Qty: 6 ML | Refills: 3 | Status: SHIPPED | OUTPATIENT
Start: 2024-09-25 | End: 2025-09-24

## 2024-09-27 ENCOUNTER — OFFICE VISIT (OUTPATIENT)
Dept: PRIMARY CARE | Facility: CLINIC | Age: 79
End: 2024-09-27
Payer: MEDICARE

## 2024-09-27 VITALS
WEIGHT: 196 LBS | DIASTOLIC BLOOD PRESSURE: 65 MMHG | OXYGEN SATURATION: 98 % | SYSTOLIC BLOOD PRESSURE: 108 MMHG | HEART RATE: 100 BPM | BODY MASS INDEX: 29.8 KG/M2

## 2024-09-27 DIAGNOSIS — T14.8XXA BRUISE: Primary | ICD-10-CM

## 2024-09-27 PROCEDURE — 99213 OFFICE O/P EST LOW 20 MIN: CPT | Performed by: INTERNAL MEDICINE

## 2024-09-27 PROCEDURE — 3078F DIAST BP <80 MM HG: CPT | Performed by: INTERNAL MEDICINE

## 2024-09-27 PROCEDURE — 3074F SYST BP LT 130 MM HG: CPT | Performed by: INTERNAL MEDICINE

## 2024-09-27 PROCEDURE — 1159F MED LIST DOCD IN RCRD: CPT | Performed by: INTERNAL MEDICINE

## 2024-09-27 NOTE — PROGRESS NOTES
Subjective   Patient ID: Saulo Aguilar is a 79 y.o. male who presents for bruise (Leg bruise from upper thigh to calf).    HPI     Reports having left knee pain a few nights ago and the next am he woke up woth a bruise on his inner thigh and now extends to his calf. Has mild pain at the area but no swelling or edema. He does not remember having an trauma to the area.     Review of Systems   All other systems reviewed and are negative.      Objective   Wt 88.9 kg (196 lb)   BMI 29.80 kg/m²     Physical Exam  Musculoskeletal:      Comments: Ecchymosis left inner thigh and left calf region. No swelling or pain to palp.          Assessment/Plan       #Ecchymosis  -Likely due to unnoticed trauma. Will monitor for now and he will call me if something changes.

## 2024-10-04 ENCOUNTER — APPOINTMENT (OUTPATIENT)
Dept: ORTHOPEDIC SURGERY | Facility: CLINIC | Age: 79
End: 2024-10-04
Payer: MEDICARE

## 2024-10-04 ENCOUNTER — OFFICE VISIT (OUTPATIENT)
Dept: ORTHOPEDIC SURGERY | Facility: CLINIC | Age: 79
End: 2024-10-04
Payer: MEDICARE

## 2024-10-04 ENCOUNTER — OFFICE VISIT (OUTPATIENT)
Dept: URGENT CARE | Age: 79
End: 2024-10-04
Payer: MEDICARE

## 2024-10-04 ENCOUNTER — HOSPITAL ENCOUNTER (OUTPATIENT)
Dept: RADIOLOGY | Facility: HOSPITAL | Age: 79
Discharge: HOME | End: 2024-10-04
Payer: MEDICARE

## 2024-10-04 VITALS
BODY MASS INDEX: 28.89 KG/M2 | OXYGEN SATURATION: 96 % | WEIGHT: 190 LBS | DIASTOLIC BLOOD PRESSURE: 80 MMHG | RESPIRATION RATE: 20 BRPM | HEART RATE: 104 BPM | TEMPERATURE: 98.4 F | SYSTOLIC BLOOD PRESSURE: 131 MMHG

## 2024-10-04 DIAGNOSIS — G89.29 CHRONIC PAIN OF LEFT KNEE: ICD-10-CM

## 2024-10-04 DIAGNOSIS — G89.29 CHRONIC PAIN OF LEFT KNEE: Primary | ICD-10-CM

## 2024-10-04 DIAGNOSIS — J06.9 VIRAL URI: ICD-10-CM

## 2024-10-04 DIAGNOSIS — M25.562 CHRONIC PAIN OF LEFT KNEE: ICD-10-CM

## 2024-10-04 DIAGNOSIS — R05.9 COUGH IN ADULT PATIENT: Primary | ICD-10-CM

## 2024-10-04 DIAGNOSIS — M25.562 ACUTE PAIN OF LEFT KNEE: ICD-10-CM

## 2024-10-04 DIAGNOSIS — M25.562 CHRONIC PAIN OF LEFT KNEE: Primary | ICD-10-CM

## 2024-10-04 LAB — POC SARS-COV-2 AG BINAX: NORMAL

## 2024-10-04 PROCEDURE — 1159F MED LIST DOCD IN RCRD: CPT | Performed by: ORTHOPAEDIC SURGERY

## 2024-10-04 PROCEDURE — 99204 OFFICE O/P NEW MOD 45 MIN: CPT | Performed by: ORTHOPAEDIC SURGERY

## 2024-10-04 PROCEDURE — 1125F AMNT PAIN NOTED PAIN PRSNT: CPT | Performed by: ORTHOPAEDIC SURGERY

## 2024-10-04 PROCEDURE — 1160F RVW MEDS BY RX/DR IN RCRD: CPT | Performed by: ORTHOPAEDIC SURGERY

## 2024-10-04 PROCEDURE — 73564 X-RAY EXAM KNEE 4 OR MORE: CPT | Mod: LT

## 2024-10-04 RX ORDER — DICLOFENAC SODIUM 10 MG/G
4 GEL TOPICAL 4 TIMES DAILY
Qty: 4 G | Refills: 0 | Status: SHIPPED | OUTPATIENT
Start: 2024-10-04

## 2024-10-04 ASSESSMENT — PAIN SCALES - GENERAL: PAINLEVEL_OUTOF10: 4

## 2024-10-04 ASSESSMENT — PAIN - FUNCTIONAL ASSESSMENT: PAIN_FUNCTIONAL_ASSESSMENT: 0-10

## 2024-10-04 NOTE — PROGRESS NOTES
"Subjective   Patient ID: Delano Aguilar \"Saulo\" is a 79 y.o. male. They present today with a chief complaint of Request For Covid Testing.    History of Present Illness  HPI  Pt is a 79 yr old male with cough and sore throat for 3 days.  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    Mixed hearing loss, bilateral    Old myocardial infarction     History of non-ST elevation myocardial infarction (NSTEMI)    Other conditions influencing health status     Leukemia    Personal history of malignant neoplasm, unspecified     History of malignant neoplasm    Personal history of other endocrine, nutritional and metabolic disease     History of thyroid disease    Personal history of other specified conditions     History of heartburn       Past Surgical History:   Procedure Laterality Date    OTHER SURGICAL HISTORY  09/10/2021    Uvulopalatopharyngoplasty    OTHER SURGICAL HISTORY  09/10/2021    Nasal septoplasty    OTHER SURGICAL HISTORY  12/29/2019    Coronary artery bypass graft    SINUS SURGERY  01/22/2015    Sinus Surgery    TONSILLECTOMY  01/22/2015    Tonsillectomy    US GUIDED THYROID BIOPSY  8/18/2014    US GUIDED THYROID BIOPSY 8/18/2014 [facility]        reports that he has been smoking cigars. He has never used smokeless tobacco. He reports current alcohol use. He reports that he does not use drugs.    Review of Systems  Review of Systems  Gen: No fatigue, fever, sweats.  Head: No headache, trauma.  Eyes: No vision loss, double vision, drainage, eye pain.  ENT: No hearing changes, + throat pain, epistaxis, " congestion  Cardiac: No chest pain  Pulmonary: No shortness of breath,  pleuritic pain, + cough  Heme/lymph: No swollen glands  GI: No abdominal pain, nausea, vomiting, diarrhea  : No  dysuria, frequency, urgency, hematuria  Musculoskeletal: No limb pain, joint pain, back pain, joint swelling or stiffness.  Skin: No rashes, pruritus, lumps, lesions.  Neuro: No Numbness, tingling, or weakness.  Psych: No  anxiety     Review of systems is otherwise negative unless stated above or in history of present illness.                             Objective    Vitals:    10/04/24 1709   BP: 131/80   Pulse: 104   Resp: 20   Temp: 36.9 °C (98.4 °F)   SpO2: 96%   Weight: 86.2 kg (190 lb)     No LMP for male patient.    Physical Exam  General: Vital signs stable, Pt is alert, no acute distress  Eyes: Conjunctiva normal, PERRL, EOMs intact  HENMT: Normocephalic, atraumatic, external ears and nose normal, no scars or masses.  No mastoid tenderness. Trachea is midline. No meningeal signs, negative Kernig and Brudzinski, moves neck freely.  No sinus tenderness  Resp: Respiratory effort is normal, no retractions, no stridor. Lungs CTA, no wheezes or rhonchi  CV: Heart is regular rate and rhythm.   Skin: No evidence of trauma, skin is warm and dry. No rashes, lesions or ulcers.  Skel: full range of motion of upper and lower extremities.   Neuro: Normal gait, CN II-XII intact, no motor or sensory changes.  Psych: Alert and oriented ×3, judgment is appropriate, normal mood and affect   Procedures    Point of Care Test & Imaging Results from this visit  Results for orders placed or performed in visit on 10/04/24   POCT Covid-19 Rapid Antigen   Result Value Ref Range    POC LLOYD-COV-2 AG  Presumptive negative test for SARS-CoV-2 (no antigen detected)     Presumptive negative test for SARS-CoV-2 (no antigen detected)      No results found.    Diagnostic study results (if any) were reviewed by ARMEN Ferrari-CNP.    Assessment/Plan    Allergies, medications, history, and pertinent labs/EKGs/Imaging reviewed by WILLIAN Ferrari.     Medical Decision Making  You have an upper respiratory infection which is viral in nature and will go away on their own. No antibiotics are needed at this time. Upper respiratory tract infections can be contagious as soon as the start of your symptoms. Your symptoms can persist up to 2 to 3 weeks. Symptoms usually peak after 3 or 5 days. The virus can shed by hand-to-hand contact, nose and eye contact. Supportive care would be the use saline nasal spray to clear up any of the nasal drainage. Vicks VapoRub for the cough. You may use a humidifier at night. You can use Mucinex to break up congestion. Cough suppressants and decongestants are also helpful to treat symptoms such as cough, runny nose, stuffy nose, sneezing and itchy or watery eyes. You may take Tylenol or Motrin for any aches or pains. Stay well-hydrated and drink plenty of fluids. Follow-up with your primary care physician on an outpatient basis unless you have worsening symptoms then you should go to the nearest ER.    Orders and Diagnoses  Diagnoses and all orders for this visit:  Cough in adult patient  -     POCT Covid-19 Rapid Antigen      Medical Admin Record      Patient disposition: Home    Electronically signed by WILLIAN Ferrari  5:27 PM

## 2024-10-04 NOTE — PROGRESS NOTES
This is a consultation from Dr. Isaac Gallegos DO for   Chief Complaint   Patient presents with    Left Knee - Pain       This is a 79 y.o. male who presents for evaluation of left knee pain.  Patient states this is a new issue for him over the last few months, sharp pain over the medial aspect of the knee.  He also has some pain in the back of his left thigh.  No numbness or tingling no fevers or chills no shooting to the leg.  He is never had injections or surgery.  He is taken over-the-counter anti-inflammatories which are helpful and he has been stretching and icing.    Physical Exam    There has been no interval change in this patient's past medical, surgical, medications, allergies, family history or social history since the most recent visit to a provider within our department. 14 point review of systems was performed, reviewed, and negative except for pertinent positives documented in the history of present illness.     Constitutional: well developed, well nourished male in no acute distress  Psychiatric: normal mood, appropriate affect  Eyes: sclera anicteric  HENT: normocephalic/atraumatic  CV: regular rate and rhythm   Respiratory: non labored breathing  Integumentary: no rash  Neurological: moves all extremities    Left knee exam: skin intact no lacerations or abrations. no effusion.  Tender medial joint line. negative log roll negative patellar grind. ROM 0-120. stable to varus and valgus stress at 0 and 30 degrees. negative lachman negative posterior drawer negative meagan. 5/5 ehl/fhl/gs/ta. silt s/s/sp/dp/t. 2+ dp/pt        Xrays were ordered by me, they were reviewed and independently interpreted by me today, they show mild degenerative changes no fracture no dislocation of the left knee, well-maintained joint space.    Procedures      Impression/Plan: This is a 79 y.o. 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  I had an in depth discussion with the patient regarding treatment  options for arthritis and their relative risks and benefits. We reviewed surgical and nonsurgical option for treatment. Treatments include anti inflammatory medications, physical therapy, weight loss, activity modification, use of assistive devices, injection therapies. We discussed current prescriptions and risks and benefits of continuation of prescription medication as apporpriate. We discussed that arthritis is often progressive over time, an in end stage arthritis surgical interventions can be considered, including arthroplasty. All questions were answered and the patient voiced their understanding.  Will get him set up with physical therapy for the tendinitis as well as a topical anti-inflammatory and see him back as needed    BMI Readings from Last 1 Encounters:   09/27/24 29.80 kg/m²      Lab Results   Component Value Date    CREATININE 0.77 08/02/2024     Tobacco Use: High Risk (10/4/2024)    Patient History     Smoking Tobacco Use: Some Days     Smokeless Tobacco Use: Never     Passive Exposure: Not on file      Computed MELD 3.0 unavailable. One or more values for this score either were not found within the given timeframe or did not fit some other criterion.  Computed MELD-Na unavailable. One or more values for this score either were not found within the given timeframe or did not fit some other criterion.       Lab Results   Component Value Date    HGBA1C 5.7 (H) 08/02/2024     Lab Results   Component Value Date    STAPHMRSASCR NO Staphylococcus aureus ISOLATED. 06/02/2023

## 2024-10-11 ENCOUNTER — APPOINTMENT (OUTPATIENT)
Dept: ORTHOPEDIC SURGERY | Facility: CLINIC | Age: 79
End: 2024-10-11
Payer: COMMERCIAL

## 2024-10-23 ENCOUNTER — OFFICE VISIT (OUTPATIENT)
Dept: CARDIOLOGY | Facility: HOSPITAL | Age: 79
End: 2024-10-23
Payer: MEDICARE

## 2024-10-23 VITALS
HEART RATE: 79 BPM | BODY MASS INDEX: 29.3 KG/M2 | WEIGHT: 193.3 LBS | HEIGHT: 68 IN | SYSTOLIC BLOOD PRESSURE: 129 MMHG | DIASTOLIC BLOOD PRESSURE: 70 MMHG

## 2024-10-23 DIAGNOSIS — I25.2 HISTORY OF NON-ST ELEVATION MYOCARDIAL INFARCTION (NSTEMI): Primary | ICD-10-CM

## 2024-10-23 PROCEDURE — G2211 COMPLEX E/M VISIT ADD ON: HCPCS | Performed by: INTERNAL MEDICINE

## 2024-10-23 PROCEDURE — 3074F SYST BP LT 130 MM HG: CPT | Performed by: INTERNAL MEDICINE

## 2024-10-23 PROCEDURE — 1159F MED LIST DOCD IN RCRD: CPT | Performed by: INTERNAL MEDICINE

## 2024-10-23 PROCEDURE — 3078F DIAST BP <80 MM HG: CPT | Performed by: INTERNAL MEDICINE

## 2024-10-23 PROCEDURE — 99214 OFFICE O/P EST MOD 30 MIN: CPT | Performed by: INTERNAL MEDICINE

## 2024-10-23 PROCEDURE — 4004F PT TOBACCO SCREEN RCVD TLK: CPT | Performed by: INTERNAL MEDICINE

## 2024-10-23 RX ORDER — ROSUVASTATIN CALCIUM 5 MG/1
5 TABLET, COATED ORAL DAILY
Qty: 90 TABLET | Refills: 3 | Status: SHIPPED | OUTPATIENT
Start: 2024-10-23 | End: 2025-10-23

## 2024-10-23 ASSESSMENT — ENCOUNTER SYMPTOMS
LOSS OF SENSATION IN FEET: 0
OCCASIONAL FEELINGS OF UNSTEADINESS: 0
DEPRESSION: 0

## 2024-10-23 NOTE — PROGRESS NOTES
"Chief Complaint:   No chief complaint on file.     History Of Present Illness:    Saulo Aguilar is a 79 y.o. male here for followup. Hospitalized late 10/2019 with a chest pain syndrome and diagnosed with an NSTEMI with a peak troponin of 1.39 and no WMA on echocardiogram. Coronary angiography revealed multivessel disease. He is now s/p 3vCABG (LIMA-LAD, f-MALA to OM (T graft from LIMA), and left RA to the PDA). Had some post operative atrial fibrillation. He represented a few days after discharge and was diagnosed with a pulmonary embolism which was treated with AC. Had been dealing with post op pleural effusions and dyspnea which resolved with diuretics.     He reports doing relatively well from a cardiovascular standpoint. Gets exercise through walking. Has been losing weight on Topamax.           Last Recorded Vitals:  Vitals:    10/23/24 1112   BP: 129/70   BP Location: Right arm   Pulse: 79   Weight: 87.7 kg (193 lb 4.8 oz)   Height: 1.727 m (5' 8\")             Allergies:  Ampicillin    Outpatient Medications:  Current Outpatient Medications   Medication Instructions    alirocumab (Praluent Pen) 150 mg/mL pen injector INJECT 150 MG (1 PEN) BENEATH THE SKIN ONCE EVERY TWO WEEKS.    aspirin 81 mg EC tablet 1 tablet, Daily    buPROPion XL (Wellbutrin XL) 300 mg 24 hr tablet TAKE 1 TABLET BY MOUTH EVERY DAY    diclofenac sodium (VOLTAREN) 4 g, Topical, 4 times daily    escitalopram (Lexapro) 5 mg tablet TAKE 1 TABLET DAILY.    ibuprofen 600 mg, oral, Daily PRN    ketoconazole (NIZOral) 2 % cream Apply Topically a thin a layer to affected areas twice daily    losartan (COZAAR) 50 mg, oral, Daily    metFORMIN (GLUCOPHAGE) 1,000 mg, oral, 2 times daily (morning and late afternoon)    pantoprazole (ProtoNix) 40 mg EC tablet TAKE ONE TABLET BY MOUTH DAILY    topiramate (TOPAMAX) 100 mg, oral, Nightly    triamcinolone (Kenalog) 0.1 % cream Apply and rub in a thin film to affected areas twice daily (AM and PM) "         Physical Exam:  Gen Well appearing septuagenarian male sitting up in NAD. Body mass index is 29.39 kg/m².   CV rrr. No m/r/g appreciated. No JVD or leg edema.   Pulm Lungs clear with normal respiratory effort.  Neuro Alert and conversant. Grossly nonfocal.        I reviewed most recent imaging / labs / and office notes.      Assessment/Plan   1. CAD with NSTEMI s/p CABG  On aspirin. Somehow off his statin. Had a very low LDL but statin still indicated. Starting low dose Crestor. His PCSK9-i is to continue.    2. Hypertension   BP well controlled. His present regimen is to continue.     3. Hyperlipidemia   On a PCSK9-i. Restarting statin as above.      Follow-up 6 mos        Navin Hinds MD

## 2024-12-09 ENCOUNTER — TELEPHONE (OUTPATIENT)
Dept: CARDIOLOGY | Facility: HOSPITAL | Age: 79
End: 2024-12-09
Payer: MEDICARE

## 2024-12-09 NOTE — TELEPHONE ENCOUNTER
Patient switched insurance companies for his prescriptions. They told him they will not cover repatha but will cover Praluent. However it is very expensive. Patient called asking if there was another medication he could be on or what he should do?

## 2024-12-11 DIAGNOSIS — E78.5 HYPERLIPIDEMIA, UNSPECIFIED HYPERLIPIDEMIA TYPE: Primary | ICD-10-CM

## 2025-01-02 ENCOUNTER — SPECIALTY PHARMACY (OUTPATIENT)
Dept: PHARMACY | Facility: CLINIC | Age: 80
End: 2025-01-02

## 2025-01-02 PROCEDURE — RXMED WILLOW AMBULATORY MEDICATION CHARGE

## 2025-01-06 ENCOUNTER — PHARMACY VISIT (OUTPATIENT)
Dept: PHARMACY | Facility: CLINIC | Age: 80
End: 2025-01-06
Payer: COMMERCIAL

## 2025-01-16 ENCOUNTER — APPOINTMENT (OUTPATIENT)
Dept: PHARMACY | Facility: HOSPITAL | Age: 80
End: 2025-01-16
Payer: MEDICARE

## 2025-01-16 DIAGNOSIS — E78.5 HYPERLIPIDEMIA, UNSPECIFIED HYPERLIPIDEMIA TYPE: ICD-10-CM

## 2025-01-16 DIAGNOSIS — R21 RASH: ICD-10-CM

## 2025-01-16 RX ORDER — KETOCONAZOLE 20 MG/G
CREAM TOPICAL
Qty: 60 G | Refills: 1 | Status: SHIPPED | OUTPATIENT
Start: 2025-01-16

## 2025-01-16 NOTE — ASSESSMENT & PLAN NOTE
Recent lipid panel shows LDL at goal, however triglycerides were elevated at 202 mg/dL. Rosuvastatin was subsequently started. Recommend lipid panel prior to next cardiology appointment as patient will have been taking current regimen for 3 months uninterrupted at that time.

## 2025-01-16 NOTE — PROGRESS NOTES
"  Pharmacist Clinic: Cardiology Management    Delano Aguilar \"Saulo\" is a 79 y.o. male was referred to Clinical Pharmacy Team for cholesterol management.     Referring Provider: Navin Hinds MD    THIS IS A NEW PATIENT APPOINTMENT. PATIENT WILL BE ESTABLISHING CARE WITH CLINICAL PHARMACY.    Appointment was completed by the patient who was reached at .    REVIEW OF PAST APPNT (IF APPLICABLE):   N/A    Allergies Reviewed? Yes; patient unsure of ampicillin allergy    Allergies   Allergen Reactions    Ampicillin Unknown     puffiness occurred.       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    Mixed hearing loss, bilateral    Old myocardial infarction     History of non-ST elevation myocardial infarction (NSTEMI)    Other conditions influencing health status     Leukemia    Personal history of malignant neoplasm, unspecified     History of malignant neoplasm    Personal history of other endocrine, nutritional and metabolic disease     History of thyroid disease    Personal history of other specified conditions     History of heartburn       Current Outpatient Medications on File Prior to Visit   Medication Sig Dispense Refill    alirocumab (Praluent Pen) 150 mg/mL pen injector INJECT 150 MG (1 PEN) BENEATH THE SKIN ONCE EVERY TWO WEEKS. 6 mL 3    aspirin 81 mg EC tablet Take 1 tablet (81 mg) by mouth once daily.      buPROPion XL (Wellbutrin XL) 300 mg 24 hr tablet TAKE 1 TABLET BY MOUTH EVERY DAY 90 tablet 1    escitalopram (Lexapro) 5 mg tablet TAKE 1 TABLET DAILY. 90 tablet 2    ketoconazole (NIZOral) 2 % cream Apply Topically a thin a layer to affected areas twice daily (Patient taking differently: As needed) 60 g 1    losartan (Cozaar) 50 mg tablet TAKE 1 TABLET BY MOUTH DAILY 90 tablet 3    metFORMIN (Glucophage) 1,000 mg tablet Take 1 tablet (1,000 " "mg) by mouth 2 times a day with meals. 180 tablet 2    pantoprazole (ProtoNix) 40 mg EC tablet TAKE ONE TABLET BY MOUTH DAILY 90 tablet 1    rosuvastatin (Crestor) 5 mg tablet Take 1 tablet (5 mg) by mouth once daily. 90 tablet 3    topiramate (Topamax) 100 mg tablet Take 1 tablet (100 mg) by mouth once daily at bedtime. 30 tablet 5    diclofenac sodium (Voltaren) 1 % gel Apply 4.5 inches (4 g) topically 4 times a day. (Patient not taking: Reported on 1/16/2025) 4 g 0    ibuprofen 600 mg tablet Take 1 tablet (600 mg) by mouth once daily as needed for mild pain (1 - 3). (Patient not taking: Reported on 1/16/2025) 30 tablet 0    triamcinolone (Kenalog) 0.1 % cream Apply and rub in a thin film to affected areas twice daily (AM and PM) (Patient not taking: Reported on 1/16/2025)       No current facility-administered medications on file prior to visit.         RELEVANT LAB RESULTS:  Lab Results   Component Value Date    BILITOT 0.4 08/02/2024    CALCIUM 9.6 08/02/2024    CO2 21 08/02/2024     (H) 08/02/2024    CREATININE 0.77 08/02/2024    GLUCOSE 98 08/02/2024    ALKPHOS 76 08/02/2024    K 4.4 08/02/2024    PROT 7.0 08/02/2024     08/02/2024    AST 18 08/02/2024    ALT 17 08/02/2024    BUN 20 08/02/2024    ANIONGAP 14 08/02/2024    MG 2.70 (H) 11/14/2019    PHOS 3.8 11/14/2019    ALBUMIN 4.4 08/02/2024    GFRMALE >90 06/02/2023     Lab Results   Component Value Date    TRIG 202 (H) 08/02/2024    CHOL 133 08/02/2024    LDLCALC 48 08/02/2024    HDL 45.0 08/02/2024     No results found for: \"BMCBC\", \"CBCDIF\"     PHARMACEUTICAL ASSESSMENT:    MEDICATION RECONCILIATION    Home Pharmacy Reviewed? Yes, describe: [facility] [place];  Specialty for Praluent    Added:  - None    Changed:  - Ibuprofen 600 mg: patient not taking  - Ketoconazole cream: taking as needed  -Triamcinolone cream: patient not taking    Removed:  - None    Drug Interactions? No clinically significant drug interactions requiring " change in therapy found at the time of this visit.     Medication Documentation Review Audit       Reviewed by Joana Blevins PharmD (Pharmacist) on 25 at 1054      Medication Order Taking? Sig Documenting Provider Last Dose Status   alirocumab (Praluent Pen) 150 mg/mL pen injector [id number] Yes INJECT 150 MG (1 PEN) BENEATH THE SKIN ONCE EVERY TWO WEEKS. Navin Hinds MD  Active   aspirin 81 mg EC tablet [id number] Yes Take 1 tablet (81 mg) by mouth once daily. Historical Provider, MD  Active   buPROPion XL (Wellbutrin XL) 300 mg 24 hr tablet [id number] Yes TAKE 1 TABLET BY MOUTH EVERY DAY Isaac Gallegos DO  Active   diclofenac sodium (Voltaren) 1 % gel [id number]  Apply 4.5 inches (4 g) topically 4 times a day.   Patient not taking: Reported on 2025    Sarmad Arcos MD  Active   escitalopram (Lexapro) 5 mg tablet [id number] Yes TAKE 1 TABLET DAILY. Isaac Gallegos DO  Active   ibuprofen 600 mg tablet [id number]  Take 1 tablet (600 mg) by mouth once daily as needed for mild pain (1 - 3).   Patient not taking: Reported on 2025    Yonatan Frederick MD   01/10/24 [id number]   ketoconazole (NIZOral) 2 % cream [id number] Yes Apply Topically a thin a layer to affected areas twice daily   Patient taking differently: As needed    Isaac Gallegos DO  Active   losartan (Cozaar) 50 mg tablet [id number] Yes TAKE 1 TABLET BY MOUTH DAILY Navin iHnds MD  Active   metFORMIN (Glucophage) 1,000 mg tablet [id number] Yes Take 1 tablet (1,000 mg) by mouth 2 times a day with meals. Isaac Gallegos DO  Active   pantoprazole (ProtoNix) 40 mg EC tablet [id number] Yes TAKE ONE TABLET BY MOUTH DAILY Isaac Gallegos DO  Active   rosuvastatin (Crestor) 5 mg tablet [id number] Yes Take 1 tablet (5 mg) by mouth once daily. Navin Hinds MD  Active   topiramate (Topamax) 100 mg tablet [id number] Yes Take 1 tablet (100 mg) by mouth once daily at bedtime. Isaac Gallegos DO  Active   triamcinolone (Kenalog) 0.1 % cream [id number]  Apply and rub  in a thin film to affected areas twice daily (AM and PM)   Patient not taking: Reported on 1/16/2025    Historical Provider, MD  Active                    DISEASE MANAGEMENT ASSESSMENT:     HYPERCHOLESTEROLEMIA ASSESSMENT    RECENT LIPID PANEL (DATE): 8/2/2024  Lab Results   Component Value Date    TRIG 202 (H) 08/02/2024    CHOL 133 08/02/2024    LDLCALC 48 08/02/2024    HDL 45.0 08/02/2024          ASCVD SCORE: The ASCVD Risk score (Montse DK, et al., 2019) failed to calculate for the following reasons:    Risk score cannot be calculated because patient has a medical history suggesting prior/existing ASCVD  Coronary Heart Disease (MI, angina, coronary artery stenosis): Yes   History of ischemic stroke? No   History of carotid artery stenosis? No   Peripheral artery disease? No   ASCVD RISK FACTORS:    CKD? No   Diabetes? No   HTN? Yes   Persistently elevated LDL? No   Elevated triglycerides? Yes   Inflammatory diseases (rheumatoid arthritis, psoriasis, HIV)? No    CURRENT PHARMACOTHERAPY  - Statin? Yes, describe: Rosuvastatin 5 mg every day   - Ezetimibe? No  - PCSK9-I? Yes, describe: Praluent 150 mg every 14 days  - Other lipid lowering agents? No      RELEVANT PAST MEDICAL HISTORY:   HTN, HLD, A-fib, Hx of NSTEMI, HF, Hx of PE    ASSESSMENT    Affordability/Accessibility:  PAP active through 8/26/2025  Adherence/Organization: confirms taking Praluent every 14 days  Adverse Effects: none  Recent Hospitalizations: No  Diet: does not eat a specific diet, reports eating more balanced meals when at his significant other's house (pasta, soup, salad). Has been trying to go out to eat/fast food less (once every other week).     -Breakfast: never eats breakfast     -Lunch: toast or sausage     -Dinner: will eat between 8-10PM; eats a lot of various red meats but has tried to eat less recently. Has been eating more chicken.  Exercise: No specific routine; walks his dog at the park every afternoon (1-1.5 miles). Of note,  had back surgery 1.5 years ago   Tobacco Use: Yes, describe: will smoke a cigar about once per week  Alcohol Use: No consistent use, will have a drink socially in the summertime about once per week  Next Lipid Panel: prior to April appointment (12 weeks after being back on Praluent)      EDUCATION/COUNSELING:  - Counseled patient on MOA, expectations, side effects, duration of therapy, contraindications, administration, and monitoring parameters  - Answered all patient questions and concerns    DISCUSSION/NOTES:   Today's appointment was initial visit to establish care with Clinical Pharmacy. Saulo is doing well on Praluent and rosuvastatin, reports no adverse effects. He was off of the Praluent for a short time due to cost, but was approved for [facility] in January, which is active through 8/26/2025.  Will continue current regimen, recommended lipid panel prior to next appointment with Dr. Hinds as rosuvastatin was recently restarted.  Will follow up 1 month after next cardiology appointment.    ASSESSMENT:    Assessment/Plan   Problem List Items Addressed This Visit       Hyperlipidemia     Recent lipid panel shows LDL at goal, however triglycerides were elevated at 202 mg/dL. Rosuvastatin was subsequently started. Recommend lipid panel prior to next cardiology appointment as patient will have been taking current regimen for 3 months uninterrupted at that time.         Relevant Orders    Referral to Clinical Pharmacy    Lipid Panel       RECOMMENDATIONS/PLAN:    Continue:  Praluent 150 mg under the skin every 14 days  Rosuvastatin 5 mg every day     Last Appnt with Referring Provider: 10/23/2024  Next Appnt with Referring Provider: 4/23/2025  Clinical Pharmacist follow up: 5/20/2025  VAF/Application Expiration: Yes    Date: 8/26/2025  Type of Encounter: Crispin Blevins PharmD    Verbal consent to manage patient's drug therapy was obtained from the patient . They were informed they may decline to participate  or withdraw from participation in pharmacy services at any time.    Continue all meds under the continuation of care with the referring provider and clinical pharmacy team.

## 2025-01-16 NOTE — Clinical Note
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 He was off of the Praluent for a short time due to cost, but was approved for  PAP in January, which is active through 8/26/2025. Will continue current regimen, recommended lipid panel prior to his appointment with you in April. Will follow up in 4 months.

## 2025-01-29 ENCOUNTER — APPOINTMENT (OUTPATIENT)
Dept: PRIMARY CARE | Facility: CLINIC | Age: 80
End: 2025-01-29
Payer: MEDICARE

## 2025-01-29 VITALS
WEIGHT: 190 LBS | BODY MASS INDEX: 28.89 KG/M2 | SYSTOLIC BLOOD PRESSURE: 129 MMHG | HEART RATE: 102 BPM | DIASTOLIC BLOOD PRESSURE: 81 MMHG | OXYGEN SATURATION: 94 %

## 2025-01-29 DIAGNOSIS — I10 ESSENTIAL HYPERTENSION: ICD-10-CM

## 2025-01-29 DIAGNOSIS — R41.3 MEMORY LOSS: Primary | ICD-10-CM

## 2025-01-29 DIAGNOSIS — H61.21 IMPACTED CERUMEN OF RIGHT EAR: ICD-10-CM

## 2025-01-29 PROCEDURE — 1124F ACP DISCUSS-NO DSCNMKR DOCD: CPT | Performed by: INTERNAL MEDICINE

## 2025-01-29 PROCEDURE — 1159F MED LIST DOCD IN RCRD: CPT | Performed by: INTERNAL MEDICINE

## 2025-01-29 PROCEDURE — 3079F DIAST BP 80-89 MM HG: CPT | Performed by: INTERNAL MEDICINE

## 2025-01-29 PROCEDURE — 3074F SYST BP LT 130 MM HG: CPT | Performed by: INTERNAL MEDICINE

## 2025-01-29 PROCEDURE — 99214 OFFICE O/P EST MOD 30 MIN: CPT | Performed by: INTERNAL MEDICINE

## 2025-01-29 PROCEDURE — 1126F AMNT PAIN NOTED NONE PRSNT: CPT | Performed by: INTERNAL MEDICINE

## 2025-01-29 ASSESSMENT — COLUMBIA-SUICIDE SEVERITY RATING SCALE - C-SSRS
1. IN THE PAST MONTH, HAVE YOU WISHED YOU WERE DEAD OR WISHED YOU COULD GO TO SLEEP AND NOT WAKE UP?: NO
6. HAVE YOU EVER DONE ANYTHING, STARTED TO DO ANYTHING, OR PREPARED TO DO ANYTHING TO END YOUR LIFE?: NO
2. HAVE YOU ACTUALLY HAD ANY THOUGHTS OF KILLING YOURSELF?: NO

## 2025-01-29 ASSESSMENT — PAIN SCALES - GENERAL: PAINLEVEL_OUTOF10: 0-NO PAIN

## 2025-01-29 ASSESSMENT — PATIENT HEALTH QUESTIONNAIRE - PHQ9
2. FEELING DOWN, DEPRESSED OR HOPELESS: NOT AT ALL
1. LITTLE INTEREST OR PLEASURE IN DOING THINGS: NOT AT ALL
SUM OF ALL RESPONSES TO PHQ9 QUESTIONS 1 AND 2: 0

## 2025-01-29 NOTE — PROGRESS NOTES
Subjective   Patient ID: Saulo Aguilar is a 79 y.o. male who presents for Follow-up.    HPI       Back pain is well controlled    Concerned about short term memory. Has trouble remembering names and dates. Does not get lost when driving. Never left stove on or doors open etc.     Reports decrease hearing in right ear. Started 2-3 yeas ago. Left ear seems OK. Buys OTC hearing aids which seem to help well.         Review of Systems   All other systems reviewed and are negative.      Objective   /81   Pulse 102   Wt 86.2 kg (190 lb)   SpO2 94%   BMI 28.89 kg/m²     Physical Exam  Constitutional:       Appearance: Normal appearance.   HENT:      Head: Normocephalic and atraumatic.      Right Ear: Ear canal and external ear normal. There is impacted cerumen.      Left Ear: Tympanic membrane, ear canal and external ear normal. There is no impacted cerumen.   Cardiovascular:      Rate and Rhythm: Normal rate and regular rhythm.      Heart sounds: Normal heart sounds. No murmur heard.     No gallop.   Pulmonary:      Effort: Pulmonary effort is normal. No respiratory distress.      Breath sounds: No wheezing or rales.   Skin:     General: Skin is warm and dry.      Findings: No rash.   Neurological:      Mental Status: He is alert and oriented to person, place, and time. Mental status is at baseline.   Psychiatric:         Mood and Affect: Mood normal.         Behavior: Behavior normal.         Assessment/Plan       #Depression   -Stable. Cont bupropion XL 300mg daily and escitalopram 5mg daily     #Memory loss  -Refer to senior assessment     #Right sided hearing loss  -Cerumen removed via warm water irrigation     #Obesity  -Down 25-30 lbs lbs since April. Cont metformin 1000mg BID and topiramate 100mg nightly     #Lumbar radiculopathy, neck pain   -s/p L1-L2 laminectomy. Follows with pain management       #CAD s/p CABG, HLD  -CABG x3 11/2019 2/2 NSTEMI. Follows with cardiology. Continue ASA, rosuvastatin  Praluent-per cardiology, goal <55    #HTN  -Continue losartan 50mg daily, goal <130/80     #TANO  -Reports compliance with CPAP      Colonoscopy: 3/2022  PSA: 6/15/20     RTC 6 mo

## 2025-02-10 ENCOUNTER — APPOINTMENT (OUTPATIENT)
Dept: GERIATRIC MEDICINE | Facility: HOSPITAL | Age: 80
End: 2025-02-10
Payer: MEDICARE

## 2025-02-10 NOTE — PROGRESS NOTES
Division: Geriatrics  Date of Service: 2/10/2025  Visit Type: Geriatrics Specialty Clinic - Initial Consult Visit  Referral requested by:  {geriatric consult requested by:[id number]}    Chief complaint:  No chief complaint on file..     Subjective   Mr. Delano Aguilar is 79 y.o. year old male and here for comprehensive geriatric assessment.  Patient is {AGNEWPT:47821}. Mr. Delano Aguilar has a PMH of:R sided hearing loss (OTC hearing aid)***, depression (wellbutrin XL 300mg/d, lexapro 5mg daily), obesity, lumbar radiculopathy s/p L1-L2 laminectomy, neck pain, CAD s/p CABG x3, 11/2022 NSTEMI, HTN, HLP, TANO,     Here with ***. Additional history provided by: *** due to ***.    HPI     What matters most: ***    Health Goals:    Goals    None         PCP: Isaac Gallegos,     Medical records reviewed.  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    Mixed hearing loss, bilateral    Old myocardial infarction     History of non-ST elevation myocardial infarction (NSTEMI)    Other conditions influencing health status     Leukemia    Personal history of malignant neoplasm, unspecified     History of malignant neoplasm    Personal history of other endocrine, nutritional and metabolic disease     History of thyroid disease    Personal history of other specified conditions     History of heartburn     Past Surgical History:   Procedure Laterality Date    OTHER SURGICAL HISTORY  09/10/2021    Uvulopalatopharyngoplasty    OTHER SURGICAL HISTORY  09/10/2021    Nasal septoplasty    OTHER SURGICAL HISTORY  12/29/2019    Coronary artery bypass graft    SINUS SURGERY  01/22/2015    Sinus Surgery    TONSILLECTOMY  01/22/2015    Tonsillectomy    US GUIDED THYROID BIOPSY  8/18/2014    US GUIDED THYROID BIOPSY 8/18/2014 [facility] ANCILLARY LEGACY     Family History   Problem Relation Name Age of  Onset    Alzheimer's disease Mother      Blindness Mother          Legally ([place] Definition)    Colon cancer Father      Heart failure Father      Fibromyalgia Sister       Social History     Tobacco Use    Smoking status: Some Days     Types: Cigars    Smokeless tobacco: Never   Substance Use Topics    Alcohol use: Yes     Comment: on occasion/holiday       Geriatric ROS:  Visual: glasses {YES/NO:03534} Last exam: ***  Hearing: hearing {YES/NO:67126} Last exam: ***  Dental: dentures {YES/NO:15408} Last exam: ***  Sleep: how many hours at night ***, sleep aides: ***  History of Frequent falls:***  Mood:***      ENCOUNTER SCREENING RESULTS                               MIS: ***/15    Clock Drawing Test (CDT): ***/7 {AGCDT:49736}    Daily Functioning Assessment                         Safety       Living situation:       Advanced Directives on file: {advanced directive:79569}      Medications reviewed and reconciled.   Current Outpatient Medications   Medication Sig Dispense Refill    alirocumab (Praluent Pen) 150 mg/mL pen injector INJECT 150 MG (1 PEN) BENEATH THE SKIN ONCE EVERY TWO WEEKS. 6 mL 3    aspirin 81 mg EC tablet Take 1 tablet (81 mg) by mouth once daily.      buPROPion XL (Wellbutrin XL) 300 mg 24 hr tablet TAKE 1 TABLET BY MOUTH EVERY DAY 90 tablet 1    diclofenac sodium (Voltaren) 1 % gel Apply 4.5 inches (4 g) topically 4 times a day. (Patient not taking: Reported on 1/29/2025) 4 g 0    escitalopram (Lexapro) 5 mg tablet TAKE 1 TABLET DAILY. 90 tablet 2    ibuprofen 600 mg tablet Take 1 tablet (600 mg) by mouth once daily as needed for mild pain (1 - 3). (Patient not taking: Reported on 1/16/2025) 30 tablet 0    ketoconazole (NIZOral) 2 % cream Apply Topically a thin a layer to affected areas twice daily 60 g 1    losartan (Cozaar) 50 mg tablet TAKE 1 TABLET BY MOUTH DAILY 90 tablet 3    metFORMIN (Glucophage) 1,000 mg tablet Take 1 tablet (1,000 mg) by mouth 2 times a day with meals. 180 tablet 2     "pantoprazole (ProtoNix) 40 mg EC tablet TAKE ONE TABLET BY MOUTH DAILY 90 tablet 1    rosuvastatin (Crestor) 5 mg tablet Take 1 tablet (5 mg) by mouth once daily. 90 tablet 3    topiramate (Topamax) 100 mg tablet Take 1 tablet (100 mg) by mouth once daily at bedtime. 30 tablet 5    triamcinolone (Kenalog) 0.1 % cream Apply and rub in a thin film to affected areas twice daily (AM and PM)       No current facility-administered medications for this visit.       Objective   There were no vitals taken for this visit.  Physical Exam    Labs/Imaging reviewed.  Lab Results   Component Value Date    WBC 5.1 08/02/2024    HGB 15.2 08/02/2024    HCT 45.5 08/02/2024    MCV 98 08/02/2024     08/02/2024    LYMPHOPCT 14.8 06/02/2023    RBC 4.66 08/02/2024    MCH 32.6 08/02/2024    MCHC 33.4 08/02/2024    RDW 13.9 08/02/2024     Lab Results   Component Value Date     08/02/2024    K 4.4 08/02/2024     (H) 08/02/2024    CO2 21 08/02/2024    BUN 20 08/02/2024    CREATININE 0.77 08/02/2024    GLUCOSE 98 08/02/2024    CALCIUM 9.6 08/02/2024    PROT 7.0 08/02/2024    BILITOT 0.4 08/02/2024    ALKPHOS 76 08/02/2024    AST 18 08/02/2024    ALT 17 08/02/2024     Lab Results   Component Value Date    TSH 1.67 08/02/2024    TSH 0.98 07/21/2021    TSH 2.82 10/27/2019     No results found for: \"FREET4\"  Lab Results   Component Value Date    KRXNUBAH76 291 08/04/2021     Lab Results   Component Value Date    HGBA1C 5.7 (H) 08/02/2024    HGBA1C 5.7 10/29/2019     No results found for: \"VITD25\"     No results found for this or any previous visit from the past 365 days.     No results found for this or any previous visit from the past 365 days.     No results found for this or any previous visit from the past 365 days.      Assessment/Plan    {Assess/PlanSmartLinks:11156}    Discussed case with: {AGXFR:92971}    ***    Electronically signed by: Gilma Faria MD  "

## 2025-02-26 ENCOUNTER — SPECIALTY PHARMACY (OUTPATIENT)
Dept: PHARMACY | Facility: CLINIC | Age: 80
End: 2025-02-26

## 2025-03-03 DIAGNOSIS — K21.9 GASTRO-ESOPHAGEAL REFLUX DISEASE WITHOUT ESOPHAGITIS: ICD-10-CM

## 2025-03-03 DIAGNOSIS — E66.811 CLASS 1 OBESITY DUE TO EXCESS CALORIES WITHOUT SERIOUS COMORBIDITY WITH BODY MASS INDEX (BMI) OF 34.0 TO 34.9 IN ADULT: ICD-10-CM

## 2025-03-03 DIAGNOSIS — E66.09 CLASS 1 OBESITY DUE TO EXCESS CALORIES WITHOUT SERIOUS COMORBIDITY WITH BODY MASS INDEX (BMI) OF 34.0 TO 34.9 IN ADULT: ICD-10-CM

## 2025-03-03 RX ORDER — PANTOPRAZOLE SODIUM 40 MG/1
TABLET, DELAYED RELEASE ORAL
Qty: 90 TABLET | Refills: 1 | Status: SHIPPED | OUTPATIENT
Start: 2025-03-03

## 2025-03-03 RX ORDER — TOPIRAMATE 100 MG/1
100 TABLET, FILM COATED ORAL NIGHTLY
Qty: 90 TABLET | Refills: 1 | Status: SHIPPED | OUTPATIENT
Start: 2025-03-03 | End: 2025-08-30

## 2025-03-06 PROCEDURE — RXMED WILLOW AMBULATORY MEDICATION CHARGE

## 2025-03-07 ENCOUNTER — PHARMACY VISIT (OUTPATIENT)
Dept: PHARMACY | Facility: CLINIC | Age: 80
End: 2025-03-07
Payer: COMMERCIAL

## 2025-03-27 ENCOUNTER — OFFICE VISIT (OUTPATIENT)
Dept: PRIMARY CARE | Facility: CLINIC | Age: 80
End: 2025-03-27
Payer: MEDICARE

## 2025-03-27 VITALS
WEIGHT: 194 LBS | DIASTOLIC BLOOD PRESSURE: 78 MMHG | BODY MASS INDEX: 29.5 KG/M2 | HEART RATE: 125 BPM | SYSTOLIC BLOOD PRESSURE: 115 MMHG | OXYGEN SATURATION: 95 %

## 2025-03-27 DIAGNOSIS — M25.569 ACUTE KNEE PAIN, UNSPECIFIED LATERALITY: Primary | ICD-10-CM

## 2025-03-27 PROCEDURE — 99213 OFFICE O/P EST LOW 20 MIN: CPT | Performed by: INTERNAL MEDICINE

## 2025-03-27 PROCEDURE — 3078F DIAST BP <80 MM HG: CPT | Performed by: INTERNAL MEDICINE

## 2025-03-27 PROCEDURE — 1159F MED LIST DOCD IN RCRD: CPT | Performed by: INTERNAL MEDICINE

## 2025-03-27 PROCEDURE — 3074F SYST BP LT 130 MM HG: CPT | Performed by: INTERNAL MEDICINE

## 2025-03-27 PROCEDURE — 1125F AMNT PAIN NOTED PAIN PRSNT: CPT | Performed by: INTERNAL MEDICINE

## 2025-03-27 PROCEDURE — 1124F ACP DISCUSS-NO DSCNMKR DOCD: CPT | Performed by: INTERNAL MEDICINE

## 2025-03-27 ASSESSMENT — PAIN SCALES - GENERAL: PAINLEVEL_OUTOF10: 4

## 2025-03-27 ASSESSMENT — COLUMBIA-SUICIDE SEVERITY RATING SCALE - C-SSRS
2. HAVE YOU ACTUALLY HAD ANY THOUGHTS OF KILLING YOURSELF?: NO
6. HAVE YOU EVER DONE ANYTHING, STARTED TO DO ANYTHING, OR PREPARED TO DO ANYTHING TO END YOUR LIFE?: NO
1. IN THE PAST MONTH, HAVE YOU WISHED YOU WERE DEAD OR WISHED YOU COULD GO TO SLEEP AND NOT WAKE UP?: NO

## 2025-03-27 NOTE — PROGRESS NOTES
Subjective   Patient ID: Saulo Aguilar is a 79 y.o. male who presents for Knee Pain (Had dog run into left knee on tuesday).    Knee Pain        Was hit in the left knee by a 75lb dog on Tuesday, 3/25 and knocked down to the ground. He is able to ambulate on his own w/o assistance. Now has medial knee pain. Pt has taken some percocet and acetaminophen.       Review of Systems   All other systems reviewed and are negative.      Objective   Wt 88 kg (194 lb)   BMI 29.50 kg/m²     Physical Exam  Constitutional:       Appearance: Normal appearance.   HENT:      Head: Normocephalic and atraumatic.      Right Ear: Ear canal and external ear normal.      Left Ear: Tympanic membrane, ear canal and external ear normal. There is no impacted cerumen.   Cardiovascular:      Rate and Rhythm: Normal rate and regular rhythm.      Heart sounds: Normal heart sounds. No murmur heard.     No gallop.   Pulmonary:      Effort: Pulmonary effort is normal. No respiratory distress.      Breath sounds: No wheezing or rales.   Musculoskeletal:      Comments: Left medial knee tenderness. B/l full ROM & strength. Focal medial knee pain on full extension of left leg   Skin:     General: Skin is warm and dry.      Findings: No rash.   Neurological:      Mental Status: He is alert and oriented to person, place, and time. Mental status is at baseline.   Psychiatric:         Mood and Affect: Mood normal.         Behavior: Behavior normal.         Assessment/Plan     #Left knee pain   - resolving w/ conservative management   - no signs of fracture or ligamental injury  - If not better in 2-4 weeks will refer to ortho

## 2025-04-08 ENCOUNTER — APPOINTMENT (OUTPATIENT)
Facility: CLINIC | Age: 80
End: 2025-04-08
Payer: MEDICARE

## 2025-04-08 VITALS
SYSTOLIC BLOOD PRESSURE: 127 MMHG | BODY MASS INDEX: 20.25 KG/M2 | HEIGHT: 66 IN | WEIGHT: 126 LBS | HEART RATE: 126 BPM | DIASTOLIC BLOOD PRESSURE: 88 MMHG

## 2025-04-08 DIAGNOSIS — G47.33 OSA (OBSTRUCTIVE SLEEP APNEA): ICD-10-CM

## 2025-04-08 DIAGNOSIS — F10.11 HISTORY OF ETOH ABUSE: ICD-10-CM

## 2025-04-08 DIAGNOSIS — R41.3 MEMORY LOSS: Primary | ICD-10-CM

## 2025-04-08 DIAGNOSIS — G47.9 SLEEP DISTURBANCE: ICD-10-CM

## 2025-04-08 PROCEDURE — 3074F SYST BP LT 130 MM HG: CPT | Performed by: STUDENT IN AN ORGANIZED HEALTH CARE EDUCATION/TRAINING PROGRAM

## 2025-04-08 PROCEDURE — 3079F DIAST BP 80-89 MM HG: CPT | Performed by: STUDENT IN AN ORGANIZED HEALTH CARE EDUCATION/TRAINING PROGRAM

## 2025-04-08 PROCEDURE — 99205 OFFICE O/P NEW HI 60 MIN: CPT | Performed by: STUDENT IN AN ORGANIZED HEALTH CARE EDUCATION/TRAINING PROGRAM

## 2025-04-08 PROCEDURE — G2211 COMPLEX E/M VISIT ADD ON: HCPCS | Performed by: STUDENT IN AN ORGANIZED HEALTH CARE EDUCATION/TRAINING PROGRAM

## 2025-04-08 RX ORDER — THIAMINE HCL 500 MG
500 TABLET ORAL DAILY
Qty: 30 TABLET | Refills: 11 | Status: SHIPPED | OUTPATIENT
Start: 2025-04-08 | End: 2026-04-03

## 2025-04-08 RX ORDER — ACETAMINOPHEN 500 MG
5 TABLET ORAL NIGHTLY
Qty: 30 TABLET | Refills: 11 | Status: SHIPPED | OUTPATIENT
Start: 2025-04-08

## 2025-04-08 NOTE — PROGRESS NOTES
"Subjective   Mr. Aguilar 80 y.o. year old male is her alone.  Consult Requested By: patient's primary care physician, Isaac Gallegos, DO   Reason for consultation or primary concern: Memory Loss    HPI   Patient states that he knows that his mother had dementia and was seen at the memory clinic.  Patient states that he has also been having memory difficulties.  Patient missed a  by a day.  When patient goes to get his car, he does not have his keys.  Sometimes he walks in the room and does not remember what he is there for.  Patient thinks his problem is that he has not been going out much, except for taking his dog out.  He has not been socializing well.  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  Patient reports that his wife thinks patient may have difficulty with driving.  Patient has been swerving to the left, when he looks left while driving - he has a newer car, which will catch that when that occurs.  Patient kept digressing during our conversation - was hard to focus.  Patient would start telling a story, and then would forget the end of the story multiple times during our conversation.      Sleep - patient has not been sleeping well.  He was on a CPAP - he lost approx 25 lbs, and has not been using the CPAP regularly.  Patient does not feel he has been getting good sleep - though he is lying down for 6-7 hours.  Patient has been taking tylenol for sleep.    Mood - patient has had his ups and downs. Currently on lexapro - no current symptoms.  No SI / HI.    Tremor - minimal intention tremor in the left hand - worse when patient is writing.    Hallucinations - none    Falls:  patient has been \"falling a lot\".  Patient has hit his head - went into the wall when he fell last year.        Short-term memory loss history:   Severity:mild  Duration: 2 years ago - was noticed by wife.    Progression: slowly progressive  Timing: cognition " "stable  Associated symptoms:  Changes in mood/behavior: anxiety and depression  Changes in sleeping pattern: Yes - as above.  Wandering: No  Paranoia: No  REM-Sleep disorder symptoms: No  Driving safety issues: No  Medication safety issues: No  At risk for being scammed: No      History of:  Stroke: No  Head trauma: Yes - patient reports he has been falling a lot.    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    Psychiatric diseases: Yes - as above  Psychiatric hospitalization: No  TANO: Yes Using CPAP?: Yes - see above.  Hearing loss: no  Hearing aids: no    Knows 911: yes  Current President: Bashir  Upcoming holiday: Easter  Current news:[facility]  What matters most: God, family, and business in that order.    Health Goals:    Goals    None         Social history:   Alcohol - patient used to drink \"quite a few\" - patient wsa drinking 6-7 hard drinks at a time - large drinks.  Patient has drank half a bottle of burbon - one month ago.  Drugs:  patient was doing cocaine in his 40s / 50s.    Cigarettes:  stopped 25 years ago.    Living environment: Patient lives at home - wife has her own condo - wife has been coming by more and more.      ADLs:  Bathing: i  Dressing: i  Transfering: i  Toileting: i  Feeding oneself: i      IADLs:  Shopping: i  Driving: i  Managing finances: i  Taking medications: i  Preparing food: i  Housekeeping: I - wife does help.      Visual: glasses No Last exam: had Lasik one year ago.  Hearing: hearing aid Yes Last exam: never had a hearing exam - patient ordered a hearing aid from [employer].  Dental: dentures No Last exam: 6 months ago.        Advanced Directives on file: <no information>      Medications reviewed and reconciled.     Objective   /88   Pulse (!) 126   Ht 1.676 m (5' 6\")   Wt 57.2 kg (126 lb)   BMI 20.34 kg/m²       Physical Exam  Constitutional:       " "General: He is not in acute distress.     Appearance: He is not ill-appearing.   HENT:      Right Ear: External ear normal.      Left Ear: External ear normal.      Nose: Nose normal. No congestion or rhinorrhea.      Mouth/Throat:      Mouth: Mucous membranes are moist.   Eyes:      General:         Right eye: No discharge.         Left eye: No discharge.      Conjunctiva/sclera: Conjunctivae normal.   Cardiovascular:      Rate and Rhythm: Normal rate and regular rhythm.      Pulses: Normal pulses.      Heart sounds: Normal heart sounds. No murmur heard.     No friction rub. No gallop.   Pulmonary:      Effort: Pulmonary effort is normal. No respiratory distress.      Breath sounds: Normal breath sounds. No stridor. No wheezing, rhonchi or rales.   Abdominal:      General: Abdomen is flat. There is no distension.      Palpations: There is no mass.      Tenderness: There is no abdominal tenderness. There is no guarding or rebound.   Musculoskeletal:         General: No tenderness.      Right lower leg: No edema.      Left lower leg: No edema.   Skin:     Coloration: Skin is not jaundiced or pale.      Findings: No rash.   Neurological:      Comments: Minimal intention tremor left hand.     Psychiatric:         Mood and Affect: Mood normal.         Behavior: Behavior normal.       HR was wnl on my exam.      Morgan Beeews:  - MoCA was 19 out of 30.  Patient had deficits in visual-spatial/executive functioning, attention, language, abstraction, and delayed recall.  MoCA:  /30  CDT:    /5  PHQ9:   /27    Lab Results   Component Value Date    TSH 1.67 08/02/2024    TSH 0.98 07/21/2021    TSH 2.82 10/27/2019     No results found for: \"FREET4\"  Lab Results   Component Value Date    HOXAOGAD97 291 08/04/2021     Lab Results   Component Value Date    HGBA1C 5.7 (H) 08/02/2024    HGBA1C 5.7 10/29/2019     No results found for: \"VITD25\"     No results found for this or any previous visit from the past 365 days.     No " results found for this or any previous visit from the past 365 days.     No results found for this or any previous visit from the past 365 days.        Diagnoses and all orders for this visit:  Memory loss  - MoCA was 19 out of 30.  Patient had deficits in visual-spatial/executive functioning, attention, language, abstraction, and delayed recall.  Memory loss has been associated with falls - will obtain workup as below to rule out reversible causes of memory loss.  Patient has also not been using his CPAP - declined referral for a sleep study.  Willing to start melatonin.  He reports being able to do all his ADLs and IADLs.    -     Referral to Geriatrics  -     Vitamin B12; Future  -     Tsh With Reflex To Free T4 If Abnormal; Future  -     MR brain wo IV contrast neuroquant protocol; Future  TANO (obstructive sleep apnea)  - refusing repeat sleep study post weight loss.  - has not been wearing CPAP.  History of ETOH abuse  -     thiamine (Vitamin B-1) 500 mg tablet; Take 1 tablet (500 mg) by mouth once daily.  Sleep disturbance  -     melatonin 5 mg tablet; Take 1 tablet (5 mg) by mouth once daily at bedtime.  Patient declined referral to sleep medeicine.      Patient/family are agreeable to plan as above.  Patient/family advised to call with any worsening symptoms-they agree.  Shared decision making was implemented.  All patient's and family's questions were answered and concerns addressed.      Alberto Hunter MD

## 2025-04-08 NOTE — PATIENT INSTRUCTIONS
Thank you for your visit today.  Please see instructions below  Please have labs done - B12 and TSH  Please start thiamine and multivitamin supplementation  Please have MRI brain done.    Please start taking melatonin at night  Please call with any worsening symptoms  Please follow up in 1 month.

## 2025-04-22 ENCOUNTER — OFFICE VISIT (OUTPATIENT)
Dept: PAIN MEDICINE | Facility: CLINIC | Age: 80
End: 2025-04-22
Payer: MEDICARE

## 2025-04-22 VITALS — DIASTOLIC BLOOD PRESSURE: 82 MMHG | RESPIRATION RATE: 20 BRPM | SYSTOLIC BLOOD PRESSURE: 134 MMHG | HEART RATE: 87 BPM

## 2025-04-22 DIAGNOSIS — M46.1 SACROILIITIS (CMS-HCC): ICD-10-CM

## 2025-04-22 PROCEDURE — 1159F MED LIST DOCD IN RCRD: CPT | Performed by: ANESTHESIOLOGY

## 2025-04-22 PROCEDURE — 99213 OFFICE O/P EST LOW 20 MIN: CPT | Performed by: ANESTHESIOLOGY

## 2025-04-22 PROCEDURE — 3075F SYST BP GE 130 - 139MM HG: CPT | Performed by: ANESTHESIOLOGY

## 2025-04-22 PROCEDURE — G2211 COMPLEX E/M VISIT ADD ON: HCPCS | Performed by: ANESTHESIOLOGY

## 2025-04-22 PROCEDURE — 1125F AMNT PAIN NOTED PAIN PRSNT: CPT | Performed by: ANESTHESIOLOGY

## 2025-04-22 PROCEDURE — 3079F DIAST BP 80-89 MM HG: CPT | Performed by: ANESTHESIOLOGY

## 2025-04-22 ASSESSMENT — PAIN SCALES - GENERAL: PAINLEVEL_OUTOF10: 8

## 2025-04-22 ASSESSMENT — PAIN DESCRIPTION - DESCRIPTORS: DESCRIPTORS: ACHING

## 2025-04-22 ASSESSMENT — PAIN - FUNCTIONAL ASSESSMENT: PAIN_FUNCTIONAL_ASSESSMENT: 0-10

## 2025-04-22 NOTE — PROGRESS NOTES
"Subjective   Patient ID: Delano Aguilar \"Saulo\" is a 80 y.o. male presenting with complaint of right lower back and hip pain      HPI:   Delano Aguilar \"Saulo\" is a 80 y.o. male presenting with complaint of right lower back and hip pain.  Patient is status post lumbar laminectomy procedure in June 2023.  Patient has reportedly had both right-sided and left-sided sacroiliac joint injections after his laminectomy procedure.  Patient says that his left lower back and hip region have remained relatively pain-free since that time, but that the pain in his right lower back and right hip have started to become more intense over the past 2 months.  Patient describes his pain as about 8 out of 10 intensity and aching in nature.  Patient reportedly has taken Tylenol for pain relief and has also used some leftover Percocet that he had from the past.    Review of Systems   13-point ROS done and negative except for HPI.     Current Outpatient Medications   Medication Instructions    alirocumab (Praluent Pen) 150 mg/mL pen injector INJECT 150 MG (1 PEN) BENEATH THE SKIN ONCE EVERY TWO WEEKS.    aspirin 81 mg EC tablet 1 tablet, Daily    buPROPion XL (Wellbutrin XL) 300 mg 24 hr tablet TAKE 1 TABLET BY MOUTH EVERY DAY    escitalopram (Lexapro) 5 mg tablet TAKE 1 TABLET DAILY.    ketoconazole (NIZOral) 2 % cream Apply Topically a thin a layer to affected areas twice daily    losartan (COZAAR) 50 mg, oral, Daily    melatonin 5 mg, oral, Nightly    metFORMIN (GLUCOPHAGE) 1,000 mg, oral, 2 times daily (morning and late afternoon)    pantoprazole (ProtoNix) 40 mg EC tablet TAKE ONE TABLET BY MOUTH DAILY    rosuvastatin (CRESTOR) 5 mg, oral, Daily    thiamine (VITAMIN B-1) 500 mg, oral, Daily    topiramate (TOPAMAX) 100 mg, oral, Nightly    triamcinolone (Kenalog) 0.1 % cream Apply and rub in a thin film to affected areas twice daily (AM and PM)       Medical History[1]     Surgical History[2]     Family History[3]     RX " "Allergies[4]     Objective     Vitals:    04/22/25 1346   BP: 134/82   Pulse: 87   Resp: 20        Physical Exam  General: NAD, well groomed, well nourished  Eyes: Non-icteric sclera, EOMI  Ears, Nose, Mouth, and Throat: External ears and nose appear to be without deformity or rash. No lesions or masses noted. Hearing is grossly intact.   Neck: Trachea midline  Respiratory: Nonlabored breathing   Cardiovascular: No noted significant peripheral edema   Skin: No rashes or open lesions/ulcers identified on skin.    Back:   Palpation: Tenderness to palpation over lumbar paraspinous muscles.   Straight leg raise: does not reproduce their pain, bilaterally   RICKY Maneuver does reproduce pain on the right  Positive sacral thrust exam on the right   Positive Yeoman's on the right     Hip: No pain over greater trochanters.    Neurologic:   Cranial nerves grossly intact.   Sensation: Normal to light touch throughout  DTRs:normal and symmetric throughout  Méndez: absent    Psychiatric: Alert, orientation to person, place, and time. Cooperative.    Imaging personally reviewed and independently interpreted    Assessment/Plan   Delano Aguilar \"Saulo\" is a 80 y.o. male presenting with complaint of right lower back and hip pain. Patient history and evaluation consistent with right sacroiliac joint dysfunction.     Plan:  -Patient to be scheduled for right sacroiliac joint injection under fluoroscopy   -Patient's pain also likely attributed to lumbar spine neuroforaminal stenosis on the right side. We will consider right L4, L5 lumbar transforaminal epidural steroid injection under fluoroscopy in the future if patient's pain does not resolve    Medical Necessity  The patient has failed conservative treatment including different classes of medications and physical therapy.  Patient continues to rate their pain as moderate to severe, affecting quality of sleep, quality of life and  significantly impairing daily activities (ADLs) "   We discussed  the risks, benefits and alternatives of the procedure including but not limited to: Lack of efficacy , Transiently worsening pain , Bleeding, Infection , and Nerve Damage and patient is amicable to the plan      Follow up: As needed     The patient was invited to contact us back anytime with any questions or concerns and follow-up with us in the office as needed.     Diagnoses and all orders for this visit:  Sacroiliitis (CMS-[facility])    Medical necessity: The patient is presenting primarily with pain over the Right sacroiliac joint(s) and denies any significant new radicular symptoms or weakness.  The pain is constant and of moderate severity that interferes with activities of daily living and sleep. Physical exam revealed 3 positive SI joint maneuvers. The patient failed conservative therapy and physical therapy/supervised home exercise program.  Plan is to proceed with fluoroscopic guided Right Therapeutic sacroiliac joint injection.  Will plan on utilizing no more than 2 mL of volume to each joint.  We discussed the risks, benefits and alternatives to the procedure(s) and the patient would like to proceed.   This note was generated with the aid of dictation software, there may be typos despite my attempts at proofreading.           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    Mixed hearing loss, bilateral    Old myocardial infarction     History of non-ST elevation myocardial infarction (NSTEMI)    Other conditions influencing health status     Leukemia    Personal history of malignant neoplasm, unspecified     History of malignant neoplasm    Personal history of other endocrine, nutritional and metabolic disease     History of thyroid disease    Personal history of other specified conditions     History of heartburn   [2]   Past Surgical  History:  Procedure Laterality Date    OTHER SURGICAL HISTORY  09/10/2021    Uvulopalatopharyngoplasty    OTHER SURGICAL HISTORY  09/10/2021    Nasal septoplasty    OTHER SURGICAL HISTORY  12/29/2019    Coronary artery bypass graft    SINUS SURGERY  01/22/2015    Sinus Surgery    TONSILLECTOMY  01/22/2015    Tonsillectomy    US GUIDED THYROID BIOPSY  8/18/2014    US GUIDED THYROID BIOPSY 8/18/2014 [facility]   [3]   Family History  Problem Relation Name Age of Onset    Alzheimer's disease Mother      Blindness Mother          Legally ([place] Definition)    Colon cancer Father      Heart failure Father      Fibromyalgia Sister     [4]   Allergies  Allergen Reactions    Ampicillin Unknown     puffiness occurred.

## 2025-04-23 ENCOUNTER — OFFICE VISIT (OUTPATIENT)
Dept: CARDIOLOGY | Facility: HOSPITAL | Age: 80
End: 2025-04-23
Payer: MEDICARE

## 2025-04-23 VITALS
OXYGEN SATURATION: 98 % | WEIGHT: 191 LBS | HEART RATE: 58 BPM | DIASTOLIC BLOOD PRESSURE: 83 MMHG | SYSTOLIC BLOOD PRESSURE: 128 MMHG | HEIGHT: 68 IN | BODY MASS INDEX: 28.95 KG/M2

## 2025-04-23 DIAGNOSIS — Z95.1 HISTORY OF CORONARY ARTERY BYPASS SURGERY: ICD-10-CM

## 2025-04-23 DIAGNOSIS — E78.5 HYPERLIPIDEMIA, UNSPECIFIED HYPERLIPIDEMIA TYPE: ICD-10-CM

## 2025-04-23 DIAGNOSIS — I10 ESSENTIAL HYPERTENSION: Primary | ICD-10-CM

## 2025-04-23 PROBLEM — I50.32 CHRONIC DIASTOLIC HEART FAILURE: Status: RESOLVED | Noted: 2024-07-29 | Resolved: 2025-04-23

## 2025-04-23 PROCEDURE — 99213 OFFICE O/P EST LOW 20 MIN: CPT | Performed by: INTERNAL MEDICINE

## 2025-04-23 PROCEDURE — 93005 ELECTROCARDIOGRAM TRACING: CPT | Performed by: INTERNAL MEDICINE

## 2025-04-23 PROCEDURE — 1159F MED LIST DOCD IN RCRD: CPT | Performed by: INTERNAL MEDICINE

## 2025-04-23 PROCEDURE — 3079F DIAST BP 80-89 MM HG: CPT | Performed by: INTERNAL MEDICINE

## 2025-04-23 PROCEDURE — 93010 ELECTROCARDIOGRAM REPORT: CPT | Performed by: INTERNAL MEDICINE

## 2025-04-23 PROCEDURE — 3074F SYST BP LT 130 MM HG: CPT | Performed by: INTERNAL MEDICINE

## 2025-04-23 ASSESSMENT — ENCOUNTER SYMPTOMS
OCCASIONAL FEELINGS OF UNSTEADINESS: 1
DEPRESSION: 0
LOSS OF SENSATION IN FEET: 1

## 2025-04-23 NOTE — PROGRESS NOTES
"Chief Complaint:   Follow-up     History Of Present Illness:    Saulo Aguilar is a 80 y.o. male here for followup. Hospitalized late 10/2019 with a chest pain syndrome and diagnosed with an NSTEMI with a peak troponin of 1.39 and no WMA on echocardiogram. Coronary angiography revealed multivessel disease. He is now s/p 3vCABG (LIMA-LAD, f-MALA to OM (T graft from LIMA), and left RA to the PDA). Had some post operative atrial fibrillation. He represented a few days after discharge and was diagnosed with a pulmonary embolism which was treated with AC. Had been dealing with post op pleural effusions and dyspnea which resolved with diuretics.     He reports doing relatively well from a cardiovascular standpoint. Hasn't been exercises much due to the weather and back discomfort. He denies cardiovascular symptoms.            Last Recorded Vitals:  Vitals:    04/23/25 1138   BP: 128/83   BP Location: Right arm   Patient Position: Sitting   Pulse: 58   SpO2: 98%   Weight: 86.6 kg (191 lb)   Height: 1.727 m (5' 8\")             Allergies:  Patient has no known allergies.    Outpatient Medications:  Current Outpatient Medications   Medication Instructions    alirocumab (Praluent Pen) 150 mg/mL pen injector INJECT 150 MG (1 PEN) BENEATH THE SKIN ONCE EVERY TWO WEEKS.    aspirin 81 mg EC tablet 1 tablet, Daily    buPROPion XL (Wellbutrin XL) 300 mg 24 hr tablet TAKE 1 TABLET BY MOUTH EVERY DAY    escitalopram (Lexapro) 5 mg tablet TAKE 1 TABLET DAILY.    ketoconazole (NIZOral) 2 % cream Apply Topically a thin a layer to affected areas twice daily    losartan (COZAAR) 50 mg, oral, Daily    melatonin 5 mg, oral, Nightly    metFORMIN (GLUCOPHAGE) 1,000 mg, oral, 2 times daily (morning and late afternoon)    pantoprazole (ProtoNix) 40 mg EC tablet TAKE ONE TABLET BY MOUTH DAILY    rosuvastatin (CRESTOR) 5 mg, oral, Daily    thiamine (VITAMIN B-1) 500 mg, oral, Daily    topiramate (TOPAMAX) 100 mg, oral, Nightly    triamcinolone " (Kenalog) 0.1 % cream Apply and rub in a thin film to affected areas twice daily (AM and PM)         Physical Exam:  Gen Well appearing late septuagenarian male sitting up in NAD. Body mass index is 29.04 kg/m².   CV reg rate with ectopy. No m/r/g appreciated. No JVD or leg edema.   Pulm Lungs clear with normal respiratory effort.  Neuro Alert and conversant. Grossly nonfocal.        I reviewed most recent imaging / labs / and office notes.    I reviewed the patient's ECG - NSR. PAC's. Left axis deviation.       Assessment/Plan   1. CAD with NSTEMI s/p CABG  Stable by available metrics. On aspirin / statin / PCSK9-I. His present regimen is to continue. Exercise as able.    2. Hypertension   BP well controlled. His present regimen is to continue.     3. Hyperlipidemia   On a PCSK9-I and statin therapy. Last LDL at goal. His present regimen is to continue.       Follow-up 6 mos        Navin Hinds MD

## 2025-04-24 ENCOUNTER — HOSPITAL ENCOUNTER (OUTPATIENT)
Dept: PAIN MEDICINE | Facility: CLINIC | Age: 80
Discharge: HOME | End: 2025-04-24
Payer: MEDICARE

## 2025-04-24 VITALS
HEART RATE: 89 BPM | DIASTOLIC BLOOD PRESSURE: 75 MMHG | SYSTOLIC BLOOD PRESSURE: 116 MMHG | OXYGEN SATURATION: 97 % | RESPIRATION RATE: 16 BRPM

## 2025-04-24 DIAGNOSIS — M46.1 SACROILIITIS (CMS-HCC): ICD-10-CM

## 2025-04-24 PROCEDURE — 2500000004 HC RX 250 GENERAL PHARMACY W/ HCPCS (ALT 636 FOR OP/ED): Mod: JZ | Performed by: ANESTHESIOLOGY

## 2025-04-24 PROCEDURE — 27096 INJECT SACROILIAC JOINT: CPT | Performed by: ANESTHESIOLOGY

## 2025-04-24 PROCEDURE — 2550000001 HC RX 255 CONTRASTS: Performed by: ANESTHESIOLOGY

## 2025-04-24 RX ORDER — METHYLPREDNISOLONE ACETATE 40 MG/ML
INJECTION, SUSPENSION INTRA-ARTICULAR; INTRALESIONAL; INTRAMUSCULAR; SOFT TISSUE AS NEEDED
Status: COMPLETED | OUTPATIENT
Start: 2025-04-24 | End: 2025-04-24

## 2025-04-24 RX ORDER — ROPIVACAINE HYDROCHLORIDE 5 MG/ML
INJECTION, SOLUTION EPIDURAL; INFILTRATION; PERINEURAL AS NEEDED
Status: COMPLETED | OUTPATIENT
Start: 2025-04-24 | End: 2025-04-24

## 2025-04-24 RX ORDER — INDOMETHACIN 25 MG/1
CAPSULE ORAL AS NEEDED
Status: COMPLETED | OUTPATIENT
Start: 2025-04-24 | End: 2025-04-24

## 2025-04-24 RX ORDER — LIDOCAINE HYDROCHLORIDE 5 MG/ML
INJECTION, SOLUTION INFILTRATION; INTRAVENOUS AS NEEDED
Status: COMPLETED | OUTPATIENT
Start: 2025-04-24 | End: 2025-04-24

## 2025-04-24 RX ADMIN — METHYLPREDNISOLONE ACETATE 40 MG: 40 INJECTION, SUSPENSION INTRA-ARTICULAR; INTRALESIONAL; INTRAMUSCULAR; INTRASYNOVIAL; SOFT TISSUE at 13:44

## 2025-04-24 RX ADMIN — IOHEXOL 5 ML: 240 INJECTION, SOLUTION INTRATHECAL; INTRAVASCULAR; INTRAVENOUS; ORAL at 13:44

## 2025-04-24 RX ADMIN — ROPIVACAINE HYDROCHLORIDE 3 ML: 5 INJECTION EPIDURAL; INFILTRATION; PERINEURAL at 13:44

## 2025-04-24 RX ADMIN — SODIUM BICARBONATE 1 MEQ: 84 INJECTION, SOLUTION INTRAVENOUS at 13:44

## 2025-04-24 RX ADMIN — LIDOCAINE HYDROCHLORIDE 9 ML: 5 INJECTION, SOLUTION INFILTRATION at 13:44

## 2025-04-24 ASSESSMENT — PAIN DESCRIPTION - DESCRIPTORS: DESCRIPTORS: ACHING

## 2025-04-24 ASSESSMENT — PAIN SCALES - GENERAL
PAINLEVEL_OUTOF10: 2
PAINLEVEL_OUTOF10: 7

## 2025-04-24 ASSESSMENT — PAIN - FUNCTIONAL ASSESSMENT: PAIN_FUNCTIONAL_ASSESSMENT: 0-10

## 2025-04-24 NOTE — DISCHARGE INSTRUCTIONS
[facility]  [facility] [address]  [address] [id number] [phone]    POST PROCEDURE INSTRUCTIONS    Activity  Medication used during your procedure may cause some temporary weakness or numbness in your arms or legs, depending on the type of injection you received. It is recommended that you do not drive or operate machinery the day of your injection.  You do not need to stay in bed when you get home. You should be able to resume your normal activities, including work. However, any pre-existing physical restriction you had prior to the procedure may still remain.   Have a responsible adult with you if you received sedation for the procedure. Do not drive or operate machinery for 12 hours.    Pain  Immediate pain relief from the local anesthetic will wear off after several hours.  Prolonged relief from the steroid may take 3-14 days to occur.  Some patients may experience a “flare up” of their normal pain for a few days after the procedure. You may apply ice packs to the sore area for 15minutes on/ 2 hours off and take your prescribed or over the counter analgesics as needed.  Common side effects from the steroid include facial flushing, headaches,fluid retention,trouble sleeping,and cold like symptoms for 24-48 hours post procedure.  Patients receiving diagnostic injections (no steroid): pain relief is intended to be temporary. Pay attention to the percentage of pain relief that occurs compared to before your injection so you can report this to your doctor. Pain scores before and after the procedure need to be documented.    Medications  Resume your normal medications unless otherwise instructed  If you held your blood thinning medication for the procedure,you will be instructed on when to restart.    Injection site care  Keep the injection site clean and dry. You may shower and remove the Band Aid after you arrive home.  If you notice excessive  bleeding (slow general oozing that completely soaks the dressing or fresh bright red bleeding),apply pressure and call our office immediately.  Observe for signs of infection: increasing pain at injection site, redness, swelling, drainage with a foul smell, any associated fever or chills                        If you notice any of these, call our office immediately.    Diabetic patients   You may notice an elevation in blood sugar if steroid is used. Notify your primary care doctor if blood sugar levels do not return to normal.    Follow up  Make a virtual injection follow up appointment  with Dr. Frederick in 3-4 weeks      Call our office at [phone] to speak to the clinical staff with any concerns or problems.  Go to the nearest emergency room if you are not able to reach us and your problem is urgent.  Call [phone] if you develop serious symptoms such as: chest pain or difficulty breathing.

## 2025-04-24 NOTE — H&P
"Pain Management H&P    History Of Present Illness  Delano Aguilar \"Saulo\" is a 80 y.o. male presents for procedure state below. Endorses no changes in past medical history or medical health since last seen in clinic.      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    Surgical History  He has a past surgical history that includes Other surgical history (09/10/2021); Other surgical history (09/10/2021); Other surgical history (12/29/2019); US guided thyroid biopsy (8/18/2014); Tonsillectomy (01/22/2015); and Sinus surgery (01/22/2015).     Social History  He reports that he has been smoking cigars. He has never used smokeless tobacco. He reports current alcohol use. He reports that he does not use drugs.    Family History  Family History[1]     Allergies  Patient has no known allergies.    Review of Symptoms:   Constitutional: Negative for chills, diaphoresis or fever  HENT: Negative for neck swelling  Eyes:.  Negative for eye pain  Respiratory:.  Negative for cough, shortness of breath or wheezing    Cardiovascular:.  Negative for chest pain or palpitations  Gastrointestinal:.  Negative for abdominal pain, nausea and vomiting  Genitourinary:.  Negative for urgency  Musculoskeletal:  Positive for back pain. Positive for joint pain. Denies falls within the past 3 months.  Skin: Negative for wounds or itching   Neurological: Negative for dizziness, seizures, loss of consciousness and weakness  Endo/Heme/Allergies: Does not bruise/bleed easily  Psychiatric/Behavioral: Negative for depression. The patient does not appear anxious.  "     Pre-sedation Evaluation  ASA class 2  Mallampati score 2     PHYSICAL EXAM  Vitals signs reviewed  Constitutional:       General: Not in acute distress     Appearance: Normal appearance. Not ill-appearing.  HENT:     Head: Normocephalic and atraumatic  Eyes:     Conjunctiva/sclera: Conjunctivae normal  Cardiovascular:     Rate and Rhythm: Normal rate and regular rhythm  Pulmonary:     Effort: No respiratory distress  Abdominal:     Palpations: Abdomen is soft  Musculoskeletal: PECK  Skin:     General: Skin is warm and dry  Neurological:     General: No focal deficit present  Psychiatric:         Mood and Affect: Mood normal         Behavior: Behavior normal     Last Recorded Vitals  There were no vitals taken for this visit.    Relevant Results  Current Outpatient Medications   Medication Instructions    alirocumab (Praluent Pen) 150 mg/mL pen injector INJECT 150 MG (1 PEN) BENEATH THE SKIN ONCE EVERY TWO WEEKS.    aspirin 81 mg EC tablet 1 tablet, Daily    buPROPion XL (Wellbutrin XL) 300 mg 24 hr tablet TAKE 1 TABLET BY MOUTH EVERY DAY    escitalopram (Lexapro) 5 mg tablet TAKE 1 TABLET DAILY.    ketoconazole (NIZOral) 2 % cream Apply Topically a thin a layer to affected areas twice daily    losartan (COZAAR) 50 mg, oral, Daily    melatonin 5 mg, oral, Nightly    metFORMIN (GLUCOPHAGE) 1,000 mg, oral, 2 times daily (morning and late afternoon)    pantoprazole (ProtoNix) 40 mg EC tablet TAKE ONE TABLET BY MOUTH DAILY    rosuvastatin (CRESTOR) 5 mg, oral, Daily    thiamine (VITAMIN B-1) 500 mg, oral, Daily    topiramate (TOPAMAX) 100 mg, oral, Nightly    triamcinolone (Kenalog) 0.1 % cream Apply and rub in a thin film to affected areas twice daily (AM and PM)         MR lumbar spine wo IV contrast 12/01/2023    Narrative  Interpreted By:  Ela Syed,  STUDY:  MRI of the lumbar spine without IV contrast;  12/1/2023 1:28 pm    INDICATION:  Signs/Symptoms:severe back and leg  pain.    COMPARISON:  05/17/2023.    ACCESSION NUMBER(S):  [id number]    ORDERING CLINICIAN:  RAMO MANUEL    TECHNIQUE:  Sagittal and axial STIR and T1-weighted MRI images of the lumbar  spine were acquired using a spondylolysis protocol.  No contrast was  administered.    FINDINGS:  For counting purposes the last lumbarized vertebral body is labeled  L5. There is trace retrolisthesis of L1 on L2 and L2 on L3.  Alignment, vertebral body heights and marrow signal pattern are  otherwise within normal limits. There is desiccated disc signal  throughout the lumbar spine with moderate to severe disc height loss  at L1-L2 and moderate disc height loss at T12-L1, L2-L3 and L5-S1.  The conus terminates at L1 and is unremarkable. There are interval  postsurgical changes of laminectomy at L1-L2. There is nonspecific  edema within the surgical bed which is likely postsurgical.    Evaluation by level:    The axial images are degraded by patient motion artifact.    T12-L1: Disc bulge and facet arthrosis. Mild spinal canal and neural  foraminal stenosis.    L1-L2: Status post laminectomy. Disc bulge and facet arthrosis. No  significant spinal canal stenosis. Moderate bilateral neural  foraminal stenosis.    L2-L3: Disc bulge and facet arthrosis. Moderate spinal canal  stenosis, narrowing of the bilateral subarticular recess, mild right  and moderate left neural foraminal stenosis.    L3-L4: Disc bulge and facet arthrosis. No significant spinal canal  stenosis, narrowing of the bilateral subarticular recess and moderate  bilateral neural foraminal stenosis.    L4-L5: Disc bulge and facet arthrosis. No significant spinal canal  stenosis, narrowing of the bilateral subarticular recess, severe  right and moderate left neural foraminal stenosis.    L5-S1: Disc bulge eccentric to the right and facet arthrosis. Mild  spinal canal stenosis, mild narrowing of the subarticular recess,  right greater than left, severe neural foraminal  stenosis.    Impression  The axial images are degraded by patient motion artifact. Within this  limitation, there are interval postsurgical changes of laminectomy at  L1-L2 without significant spinal canal stenosis and moderate  bilateral neural foraminal stenosis.    Multilevel degenerative changes most pronounced at L2-L3 with  moderate spinal canal stenosis.    There is severe bilateral neural foraminal stenosis at L5-S1.    I personally reviewed the images/study and I agree with the findings  as stated. This study was interpreted at [place], [place].    MACRO:  None    Signed by: Ela Syed 12/1/2023 2:54 PM  Dictation workstation:   [id number]      MR lumbar spine wo IV contrast 05/17/2023    Narrative  Interpreted By:  SAMANTHA COHEN MD  MRN: [id number]  Patient Name: ZENON GOMEZ    STUDY:  MRI L-SPINE WO    INDICATION:  back pain  M54.17: Lumbosacral neuritis M51.36: Lumbar degenerative  disc disease M48.061: Lumbar stenosis    COMPARISON:  Lumbar spine MRI 07/24/2020.    ACCESSION NUMBER(S):  [id number]    ORDERING CLINICIAN:  RAMO MANUEL    TECHNIQUE:  Multiplanar multisequence MRI of the lumbar spine was performed  without the administration of intravenous contrast, according to  standard protocol.    FINDINGS:  ALIGNMENT: Straightening of usual lumbar lordosis. Slight  retrolisthesis of L2 on L3. 3 mm grade 1 anterolisthesis of L5 on S1.    VERTEBRAE: No acute fracture or aggressive osseous lesion. Vertebral  body heights are preserved.    DISCS: Moderate disc height loss at L1-2 and mild disc height loss at  L5-S1.    CONUS MEDULLARIS AND CAUDA EQUINA: The conus medullaris terminates at  L1. Compression cauda equina nerve roots at L1-2 with redundancy of  the cauda equina nerve roots proximally.    PARAVERTEBRAL SOFT TISSUES AND VISUALIZED RETROPERITONEUM: The  visualized paravertebral soft tissues appear within normal limits.    EVALUATION OF  INDIVIDUAL LEVELS:  L1-2: Disc bulge with facet hypertrophy results in severe narrowing  of the spinal canal and mass effect on the cauda equina nerve roots.  There is mild bilateral foraminal stenosis.    L2-3: Slight retrolisthesis with disc osteophyte complex asymmetric  to the left and facet hypertrophy. There is moderate narrowing of the  spinal canal and left neural foramen. There is mild narrowing of the  right neural foramen.    L3-4: Partially calcified disc bulge with facet hypertrophy. There is  mild narrowing of the spinal canal and bilateral foramina.    L4-5: Disc bulge asymmetric to the right with facet hypertrophy.  There is moderate right and mild left foraminal stenosis. There is  mild narrowing of the spinal canal and crowding of the right  subarticular recess.    L5-S1: Disc bulge with facet hypertrophy. There is moderate to severe  right and moderate left foraminal stenosis. There is mild narrowing  of the spinal canal secondary to superimposed epidural lipomatosis.    LIMITED EVALUATION OF UPPER SACRUM AND SACROILIAC JOINTS: Mild  degenerative changes of the sacroiliac joints.    Impression  Multilevel degenerative changes of the lumbar spine most prominent at  L1-2 where there is severe narrowing of the spinal canal mass-effect  on the cauda equina nerve roots, slightly progressed compared to  prior MRI 07/24/2020. Redundancy of the cauda equina nerve roots  proximal to this level noted.      MR lumbar spine wo IV contrast 11/10/2022    Narrative  * * *Final Report* * *    DATE OF EXAM: Nov 10 2022  1:21PM    [facility]   0303  -  MRI LUMBAR SPINE WO IVCON  / ACCESSION #  [id number]    PROCEDURE REASON: multiple diagnoses    * * * * Physician Interpretation * * * *    EXAMINATION:  MRI LUMBAR SPINE WO IVCON    CLINICAL HISTORY:  Spinal stenosis of lumbar region with neurogenic  claudication Spinal stenosis, lumbar region with neurogenic claudication    TECHNIQUE: Routine lumbosacral spine MR protocol  without gadolinium.  MQ:  [id number]    COMPARISON: 11/10/2022.    RESULT:    Counting reference:  Lumbosacral junction.  For the purposes of this  report,  L4-5 is considered the level of the iliac crest and assume there  are 5 lumbar-type vertebrae.  Anatomic variant:  None.    Localizer images:  No additional findings.    Alignment:    Grade 1 retrolisthesis of L1 motion to L2, and L2 relation  to L3. This is most likely secondary to degenerative facet disease.    Bone marrow signal/fracture:  No evidence of pathologic marrow  infiltration.  No evidence of prior fracture.    Conus:  The conus is within normal limits of signal intensity and  morphology. There is component of congenital spinal canal narrowing.    T12-L1:  Posterior disc osteophyte complex asymmetric to right with  bilateral facet degenerative changes. Mild right neural foraminal  stenosis. Moderate right lateral recess stenosis. Mild spinal canal  stenosis.    L1-L2:    Posterior disc osteophyte complex with bilateral facet  degenerative changes. This is superimposed on congenital spinal canal  narrowing. Moderate bilateral neural foraminal stenosis. Severe bilateral  lateral recess. Severe spinal canal stenosis with mass effect on cauda  equina.    L2-L3:    Posterior disc osteophyte complex with bilateral facet  degenerative changes. Superimposed central and left central disc  herniation with slightly inferior migration. Bilateral ligamentum flavum  thickening. Moderate left neural foraminal stenosis. Moderate right  neural foraminal stenosis. Severe bilateral lateral recess stenosis.  Severe spinal canal stenosis.    L3-L4:    Posterior disc osteophyte complex with bilateral facet  degenerative changes. Small superimposed disc herniation. Moderate  bilateral neural foraminal stenosis. Moderate right lateral recess  stenosis. Mild left lateral recess stenosis. No significant spinal canal  stenosis.    L4-L5:    Mild broad disc bulge with bilateral  "facet degenerative  changes. Moderate to severe right neural foraminal stenosis. Moderate  left neural foraminal stenosis. Mild-to-moderate bilateral lateral recess  stenosis. No significant spinal canal stenosis..    L5-S1:    Posterior disc osteophyte complex with superimposed right  central disc extrusion. Slight superior and inferior migration of the  extruded disc. Severe bilateral neural foraminal stenosis. Severe right  lateral recess stenosis. Mild-to-moderate left lateral recess stenosis.  Severe thecal sac narrowing, partly secondary to epidural lipomatosis and  the tapering of the thecal sac.    Impression  IMPRESSION:    1. Multilevel degenerative changes of the lumbar spine, with multiple  levels of severe spinal canal stenosis and bilateral lateral recess  stenosis secondary to degenerative disc disease, facet degenerative  changes, and congenital narrowing of the spinal canal, as detailed above.    2. Specifically, moderate right central disc extrusion at L5-S1, with  resulting severe right lateral recess stenosis. Severe bilateral neural  foraminal stenosis. Severe thecal sac narrowing, partly secondary to  epidural lipomatosis and tapering of thecal sac.    Anatomic Thoracic/Lumbar Variant: None.  L4-5 is considered the level of  the iliac crest and assume there are 5 lumbar-type vertebrae.                      : [facility]  Transcribe Date/Time: Nov 15 2022 11:08A    Dictated by : JOAN VELA MD    This examination was interpreted and the report reviewed and  electronically signed by:  JOAN VELA MD on Nov 15 2022 11:30AM  EST     No image results found.       No diagnosis found.     ASSESSMENT/PLAN  Delano Aguilar \"Saulo\" is a 80 y.o. male here for right SIJ injection under fluoroscopy    Patient denies any recent antibiotic use or infections, denies any blood thinner use, and denies contrast or local anesthetic allergies     Risks, benefits, alternatives discussed. All questions " answered to the best of my ability. Patient agrees to proceed.      Our plan is as follows:  - Proceed with aforementioned procedure          DO CHRIS Ashraf Pain fellow          [1]   Family History  Problem Relation Name Age of Onset    Alzheimer's disease Mother      Blindness Mother          Legally ([place] Definition)    Colon cancer Father      Heart failure Father      Fibromyalgia Sister

## 2025-04-27 LAB
ATRIAL RATE: 98 BPM
P AXIS: 51 DEGREES
P OFFSET: 182 MS
P ONSET: 133 MS
PR INTERVAL: 178 MS
Q ONSET: 222 MS
QRS COUNT: 16 BEATS
QRS DURATION: 82 MS
QT INTERVAL: 350 MS
QTC CALCULATION(BAZETT): 446 MS
QTC FREDERICIA: 412 MS
R AXIS: -55 DEGREES
T AXIS: 74 DEGREES
T OFFSET: 397 MS
VENTRICULAR RATE: 98 BPM

## 2025-04-28 ENCOUNTER — HOSPITAL ENCOUNTER (OUTPATIENT)
Dept: RADIOLOGY | Facility: HOSPITAL | Age: 80
Discharge: HOME | End: 2025-04-28
Payer: MEDICARE

## 2025-04-28 DIAGNOSIS — R41.3 MEMORY LOSS: ICD-10-CM

## 2025-04-28 PROCEDURE — 70551 MRI BRAIN STEM W/O DYE: CPT | Performed by: RADIOLOGY

## 2025-04-28 PROCEDURE — 0866T QUAN MRI ALYS BRN W/DX MRI: CPT

## 2025-04-28 PROCEDURE — 0866T QUAN MRI ALYS BRN W/DX MRI: CPT | Performed by: RADIOLOGY

## 2025-05-02 DIAGNOSIS — M48.07 LUMBOSACRAL SPINAL STENOSIS: ICD-10-CM

## 2025-05-02 DIAGNOSIS — M54.16 LEFT LUMBAR RADICULOPATHY: ICD-10-CM

## 2025-05-02 RX ORDER — OXYCODONE AND ACETAMINOPHEN 5; 325 MG/1; MG/1
1 TABLET ORAL 3 TIMES DAILY PRN
Qty: 30 TABLET | Refills: 0 | Status: SHIPPED | OUTPATIENT
Start: 2025-05-02 | End: 2025-05-12

## 2025-05-02 NOTE — TELEPHONE ENCOUNTER
60% relief from SI joint injection done 4/24 for 3 days . Pain returning but still  is 30% better. Aware to give therapeutic affect of steroid another week. Wants to know plan if doesn't continue to improve. Pt told treatment plan was  to consider right L4,L5 TFE. Asking for acute dose of Percocet 5/325.Last filled in 2023 and he still has 2 pills left. OARRS reviewed and ok. RX attached below if ok with you.

## 2025-05-02 NOTE — TELEPHONE ENCOUNTER
Patient said injection only helped for 3-4 days, when pain came back he took oxy he still had to help him sleep because of the pain. Said doctor mentioned a different proc that could be tried and also wanted to know possibly about getting a refill on his med..

## 2025-05-19 NOTE — PROGRESS NOTES
"  Pharmacist Clinic: Cardiology Management    Delano Aguilar \"Saulo\" is a 80 y.o. male was referred to Clinical Pharmacy Team for cholesterol management.     Referring Provider: Navin Hinds MD    THIS IS A FOLLOW UP PATIENT APPOINTMENT. AT LAST VISIT ON 1/16/2025 WITH PHARMACIST (Joana Blevins).    Appointment was completed by the patient who was reached at .    REVIEW OF PAST APPNT (IF APPLICABLE):   Today's appointment was initial visit to establish care with Clinical Pharmacy. Saulo is doing well on Praluent and rosuvastatin, reports no adverse effects. He was off of the Praluent for a short time due to cost, but was approved for [facility] in January, which is active through 8/26/2025.  Will continue current regimen, recommended lipid panel prior to next appointment with Dr. Hinds as rosuvastatin was recently restarted.  Will follow up 1 month after next cardiology appointment.    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    Mixed hearing loss, bilateral    Old myocardial infarction     History of non-ST elevation myocardial infarction (NSTEMI)    Other conditions influencing health status     Leukemia    Personal history of malignant neoplasm, unspecified     History of malignant neoplasm    Personal history of other endocrine, nutritional and metabolic disease     History of thyroid disease    Personal history of other specified conditions     History of heartburn       Current Outpatient Medications on File Prior to Visit   Medication Sig Dispense Refill    alirocumab (Praluent Pen) 150 mg/mL pen injector INJECT 150 MG (1 PEN) BENEATH THE SKIN ONCE EVERY TWO WEEKS. 6 mL 3    aspirin 81 mg EC tablet Take 1 tablet (81 mg) by mouth once daily.      buPROPion XL (Wellbutrin XL) 300 mg 24 hr tablet TAKE 1 TABLET BY MOUTH EVERY DAY 90 " "tablet 1    escitalopram (Lexapro) 5 mg tablet TAKE 1 TABLET DAILY. 90 tablet 2    ketoconazole (NIZOral) 2 % cream Apply Topically a thin a layer to affected areas twice daily 60 g 1    losartan (Cozaar) 50 mg tablet TAKE 1 TABLET BY MOUTH DAILY 90 tablet 3    melatonin 5 mg tablet Take 1 tablet (5 mg) by mouth once daily at bedtime. (Patient not taking: Reported on 4/23/2025) 30 tablet 11    metFORMIN (Glucophage) 1,000 mg tablet Take 1 tablet (1,000 mg) by mouth 2 times a day with meals. 180 tablet 2    pantoprazole (ProtoNix) 40 mg EC tablet TAKE ONE TABLET BY MOUTH DAILY 90 tablet 1    rosuvastatin (Crestor) 5 mg tablet Take 1 tablet (5 mg) by mouth once daily. 90 tablet 3    thiamine (Vitamin B-1) 500 mg tablet Take 1 tablet (500 mg) by mouth once daily. (Patient not taking: Reported on 4/23/2025) 30 tablet 11    topiramate (Topamax) 100 mg tablet Take 1 tablet (100 mg) by mouth once daily at bedtime. 90 tablet 1    triamcinolone (Kenalog) 0.1 % cream Apply and rub in a thin film to affected areas twice daily (AM and PM)       No current facility-administered medications on file prior to visit.         RELEVANT LAB RESULTS:  Lab Results   Component Value Date    BILITOT 0.4 08/02/2024    CALCIUM 9.6 08/02/2024    CO2 21 08/02/2024     (H) 08/02/2024    CREATININE 0.77 08/02/2024    GLUCOSE 98 08/02/2024    ALKPHOS 76 08/02/2024    K 4.4 08/02/2024    PROT 7.0 08/02/2024     08/02/2024    AST 18 08/02/2024    ALT 17 08/02/2024    BUN 20 08/02/2024    ANIONGAP 14 08/02/2024    MG 2.70 (H) 11/14/2019    PHOS 3.8 11/14/2019    ALBUMIN 4.4 08/02/2024    GFRMALE >90 06/02/2023     Lab Results   Component Value Date    TRIG 202 (H) 08/02/2024    CHOL 133 08/02/2024    LDLCALC 48 08/02/2024    HDL 45.0 08/02/2024     No results found for: \"BMCBC\", \"CBCDIF\"     PHARMACEUTICAL ASSESSMENT:    MEDICATION RECONCILIATION    Home Pharmacy Reviewed? Yes, describe: [facility] [place], [place];  Specialty for " Praluent    Added:  - None    Changed:  - Ibuprofen 600 mg: patient not taking  - Ketoconazole cream: taking as needed  -Triamcinolone cream: patient not taking    Removed:  - None    Drug Interactions? No clinically significant drug interactions requiring change in therapy found at the time of this visit.     Medication Documentation Review Audit       Reviewed by Maeve White on 04/24/25 at 1341      Medication Order Taking? Sig Documenting Provider Last Dose Status   alirocumab (Praluent Pen) 150 mg/mL pen injector [id number]  INJECT 150 MG (1 PEN) BENEATH THE SKIN ONCE EVERY TWO WEEKS. Navin Hinds MD  Active   aspirin 81 mg EC tablet [id number]  Take 1 tablet (81 mg) by mouth once daily. Historical Provider, MD  Active   buPROPion XL (Wellbutrin XL) 300 mg 24 hr tablet [id number]  TAKE 1 TABLET BY MOUTH EVERY DAY Isaac Gallegos DO  Active   escitalopram (Lexapro) 5 mg tablet [id number]  TAKE 1 TABLET DAILY. Isaac Gallegos DO  Active   ketoconazole (NIZOral) 2 % cream [id number]  Apply Topically a thin a layer to affected areas twice daily Isaac Gallegos DO  Active   losartan (Cozaar) 50 mg tablet [id number]  TAKE 1 TABLET BY MOUTH DAILY Navin Hinds MD  Active   melatonin 5 mg tablet [id number]  Take 1 tablet (5 mg) by mouth once daily at bedtime.   Patient not taking: Reported on 4/23/2025    Alberto Hunter MD  Active   metFORMIN (Glucophage) 1,000 mg tablet [id number]  Take 1 tablet (1,000 mg) by mouth 2 times a day with meals. Isaac Gallegos DO  Active   pantoprazole (ProtoNix) 40 mg EC tablet [id number]  TAKE ONE TABLET BY MOUTH DAILY Isaac Gallegos DO  Active   rosuvastatin (Crestor) 5 mg tablet [id number]  Take 1 tablet (5 mg) by mouth once daily. Navin Hinds MD  Active   thiamine (Vitamin B-1) 500 mg tablet [id number]  Take 1 tablet (500 mg) by mouth once daily.   Patient not taking: Reported on 4/23/2025    Alberto Hunter MD  Active   topiramate (Topamax) 100 mg tablet [id number]  Take 1 tablet (100 mg)  by mouth once daily at bedtime. Isaac Gallegos, DO  Active   triamcinolone (Kenalog) 0.1 % cream [id number]  Apply and rub in a thin film to affected areas twice daily (AM and PM) Historical Provider, MD  Active                    DISEASE MANAGEMENT ASSESSMENT:     HYPERCHOLESTEROLEMIA ASSESSMENT    RECENT LIPID PANEL (DATE): 8/2/2024  Lab Results   Component Value Date    TRIG 202 (H) 08/02/2024    CHOL 133 08/02/2024    LDLCALC 48 08/02/2024    HDL 45.0 08/02/2024          ASCVD SCORE: The ASCVD Risk score (Montse BENITEZ, et al., 2019) failed to calculate for the following reasons:    Risk score cannot be calculated because patient has a medical history suggesting prior/existing ASCVD  Coronary Heart Disease (MI, angina, coronary artery stenosis): Yes   History of ischemic stroke? No   History of carotid artery stenosis? No   Peripheral artery disease? No   ASCVD RISK FACTORS:    CKD? No   Diabetes? No   HTN? Yes   Persistently elevated LDL? No   Elevated triglycerides? Yes   Inflammatory diseases (rheumatoid arthritis, psoriasis, HIV)? No    CURRENT PHARMACOTHERAPY  - Statin? Yes, describe: Rosuvastatin 5 mg every day   - Ezetimibe? No  - PCSK9-I? Yes, describe: Praluent 150 mg every 14 days  - Other lipid lowering agents? No      RELEVANT PAST MEDICAL HISTORY:   HTN, HLD, A-fib, Hx of NSTEMI, HF, Hx of PE    ASSESSMENT    Affordability/Accessibility:  PAP active through 8/26/2025  Adherence/Organization: confirms taking Praluent every 14 days and rosuvastatin daily  Adverse Effects: none  Recent Hospitalizations: No  Diet: does not eat a specific diet, reports eating more balanced meals when at his significant other's house (pasta, soup, salad). Has been trying to go out to eat/fast food less (once every other week). No changes since last appointment     -Breakfast: never eats breakfast     -Lunch: toast or sausage     -Dinner: will eat between 8-10PM; eats a lot of various red meats but has tried to eat less recently.  Has been eating more chicken.  Exercise: No specific routine; walks his dog at the park every afternoon (1-1.5 miles). Of note, had back surgery 1.5 years ago   Tobacco Use: Yes, describe: will smoke a cigar about once per week  Alcohol Use: No consistent use, will have a drink socially in the summertime about once per week  Next Lipid Panel: prior to next appointment      EDUCATION/COUNSELING:  - Counseled patient on MOA, expectations, side effects, duration of therapy, contraindications, administration, and monitoring parameters  - Answered all patient questions and concerns    DISCUSSION/NOTES:   Today's appointment was 4 month follow up regarding cholesterol pharmacotherapy.  Saulo reports no adverse effects with Praluent and rosuvastatin. Lipid panel from August 2024 showed slight elevation of triglycerides, LDL was at goal. Recommend repeat panel, as rosuvastatin was started after these labs were completed.  Will follow up in 2 months to evaluate lipid panel and for  PAP renewal.     ASSESSMENT:    Assessment/Plan   Problem List Items Addressed This Visit       Hyperlipidemia    Recent lipid panel shows LDL at goal, however triglycerides were elevated at 202 mg/dL. Rosuvastatin was subsequently started. Recommend lipid panel prior to next appointment. Will continue Praluent and rosuvastatin unchanged.         Relevant Orders    Referral to Clinical Pharmacy       RECOMMENDATIONS/PLAN:    Continue:  Praluent 150 mg every 14 days  Rosuvastatin 5 mg every day   Follow up: 2 months    Last Appnt with Referring Provider: 4/23/2025  Next Appnt with Referring Provider: 10/22/2025  Clinical Pharmacist follow up: 7/29/2025  VAF/Application Expiration: Yes    Date: 8/26/2025  Type of Encounter: Crispin Blevins PharmD    Verbal consent to manage patient's drug therapy was obtained from the patient . They were informed they may decline to participate or withdraw from participation in pharmacy services at any  time.    Continue all meds under the continuation of care with the referring provider and clinical pharmacy team.

## 2025-05-20 ENCOUNTER — APPOINTMENT (OUTPATIENT)
Dept: PHARMACY | Facility: HOSPITAL | Age: 80
End: 2025-05-20
Payer: MEDICARE

## 2025-05-20 DIAGNOSIS — E78.5 HYPERLIPIDEMIA, UNSPECIFIED HYPERLIPIDEMIA TYPE: ICD-10-CM

## 2025-05-20 DIAGNOSIS — M54.16 LUMBAR RADICULOPATHY: ICD-10-CM

## 2025-05-20 NOTE — PROGRESS NOTES
Patient still has some pain on the right side since his SI injection. Stated Dr. Frederick would try another injection if the SI injection didn't help. Read Dr. Santamaria note and it stated to schedule patient for a right L4, L5 TFE if no relief from SI injection. Will put in orders to schedule patient for TFE.

## 2025-05-20 NOTE — ASSESSMENT & PLAN NOTE
Recent lipid panel shows LDL at goal, however triglycerides were elevated at 202 mg/dL. Rosuvastatin was subsequently started. Recommend lipid panel prior to next appointment. Will continue Praluent and rosuvastatin unchanged.

## 2025-05-20 NOTE — Clinical Note
Saulo Palmer Dr. reports no adverse effects with Praluent and rosuvastatin. Lipid panel from August 2024 showed slight elevation of triglycerides, LDL was at goal. Recommend repeat panel prior to next appointment, as rosuvastatin was started after these labs were completed. No changes today, will follow up in 2 months for  PAP renewal.

## 2025-05-22 ENCOUNTER — SPECIALTY PHARMACY (OUTPATIENT)
Dept: PHARMACY | Facility: CLINIC | Age: 80
End: 2025-05-22

## 2025-05-25 DIAGNOSIS — F32.0 MAJOR DEPRESSIVE DISORDER, SINGLE EPISODE, MILD: ICD-10-CM

## 2025-05-25 DIAGNOSIS — I10 ESSENTIAL (PRIMARY) HYPERTENSION: ICD-10-CM

## 2025-05-27 RX ORDER — LOSARTAN POTASSIUM 50 MG/1
50 TABLET ORAL DAILY
Qty: 90 TABLET | Refills: 3 | Status: SHIPPED | OUTPATIENT
Start: 2025-05-27

## 2025-05-27 RX ORDER — BUPROPION HYDROCHLORIDE 300 MG/1
300 TABLET ORAL DAILY
Qty: 90 TABLET | Refills: 1 | Status: SHIPPED | OUTPATIENT
Start: 2025-05-27

## 2025-06-09 ENCOUNTER — TELEPHONE (OUTPATIENT)
Dept: GERIATRIC MEDICINE | Facility: CLINIC | Age: 80
End: 2025-06-09
Payer: MEDICARE

## 2025-06-10 ENCOUNTER — APPOINTMENT (OUTPATIENT)
Facility: CLINIC | Age: 80
End: 2025-06-10
Payer: MEDICARE

## 2025-06-10 VITALS
HEIGHT: 68 IN | BODY MASS INDEX: 28.95 KG/M2 | HEART RATE: 94 BPM | SYSTOLIC BLOOD PRESSURE: 121 MMHG | TEMPERATURE: 98.1 F | DIASTOLIC BLOOD PRESSURE: 83 MMHG | WEIGHT: 191 LBS

## 2025-06-10 DIAGNOSIS — F32.0 MAJOR DEPRESSIVE DISORDER, SINGLE EPISODE, MILD: ICD-10-CM

## 2025-06-10 DIAGNOSIS — F10.11 HISTORY OF ETOH ABUSE: ICD-10-CM

## 2025-06-10 DIAGNOSIS — I25.2 HISTORY OF NON-ST ELEVATION MYOCARDIAL INFARCTION (NSTEMI): ICD-10-CM

## 2025-06-10 PROCEDURE — 99214 OFFICE O/P EST MOD 30 MIN: CPT | Performed by: STUDENT IN AN ORGANIZED HEALTH CARE EDUCATION/TRAINING PROGRAM

## 2025-06-10 PROCEDURE — 3074F SYST BP LT 130 MM HG: CPT | Performed by: STUDENT IN AN ORGANIZED HEALTH CARE EDUCATION/TRAINING PROGRAM

## 2025-06-10 PROCEDURE — 1160F RVW MEDS BY RX/DR IN RCRD: CPT | Performed by: STUDENT IN AN ORGANIZED HEALTH CARE EDUCATION/TRAINING PROGRAM

## 2025-06-10 PROCEDURE — G2211 COMPLEX E/M VISIT ADD ON: HCPCS | Performed by: STUDENT IN AN ORGANIZED HEALTH CARE EDUCATION/TRAINING PROGRAM

## 2025-06-10 PROCEDURE — 3079F DIAST BP 80-89 MM HG: CPT | Performed by: STUDENT IN AN ORGANIZED HEALTH CARE EDUCATION/TRAINING PROGRAM

## 2025-06-10 PROCEDURE — 1159F MED LIST DOCD IN RCRD: CPT | Performed by: STUDENT IN AN ORGANIZED HEALTH CARE EDUCATION/TRAINING PROGRAM

## 2025-06-10 RX ORDER — ROSUVASTATIN CALCIUM 10 MG/1
10 TABLET, COATED ORAL DAILY
Qty: 90 TABLET | Refills: 3 | Status: SHIPPED | OUTPATIENT
Start: 2025-06-10 | End: 2026-06-10

## 2025-06-10 RX ORDER — ESCITALOPRAM OXALATE 5 MG/1
10 TABLET ORAL DAILY
Qty: 90 TABLET | Refills: 2 | Status: SHIPPED | OUTPATIENT
Start: 2025-06-10

## 2025-06-10 NOTE — PROGRESS NOTES
"Subjective   Mr. Aguilar is 80 y.o. year old male and here for f/u of Follow-up.       HPI   Patient states that he has not been drinking anymore.  He is concerned that he may be getting depressed.  Patient reports that he has been having sciatic pain down his right leg - patient has been following with Ortho.      Has been feeling depressed.  Feels guilty at times.  Has been on lexapro and welbutrin.  Patient has been crying in the mornings at times.  No SI / HI.      Social history:   Alcohol - patient used to drink \"quite a few\" - patient was drinking 6-7 hard drinks at a time - large drinks.  Patient has drank half a bottle of burbon - one month ago.  Drugs:  patient was doing cocaine in his 40s / 50s.    Cigarettes:  stopped 25 years ago.     Living environment: Patient lives at home - wife has her own condo - wife has been coming by more and more.       ADLs:  Bathing: i  Dressing: i  Transfering: i  Toileting: i  Feeding oneself: i        IADLs:  Shopping: i  Driving: i  Managing finances: i  Taking medications: i  Preparing food: i  Housekeeping: I - wife does help.      Medications reviewed and reconciled.     Objective   /83 (BP Location: Right arm, Patient Position: Sitting, BP Cuff Size: Adult)   Pulse 94   Temp 36.7 °C (98.1 °F)   Ht 1.727 m (5' 8\")   Wt 86.6 kg (191 lb)   BMI 29.04 kg/m²     Physical Exam  Constitutional:       General: He is not in acute distress.     Appearance: He is not ill-appearing.   HENT:      Right Ear: External ear normal.      Left Ear: External ear normal.      Nose: Nose normal. No congestion or rhinorrhea.      Mouth/Throat:      Mouth: Mucous membranes are moist.   Eyes:      General:         Right eye: No discharge.         Left eye: No discharge.      Conjunctiva/sclera: Conjunctivae normal.   Cardiovascular:      Rate and Rhythm: Normal rate and regular rhythm.      Pulses: Normal pulses.      Heart sounds: Normal heart sounds. No murmur heard.     No " "friction rub. No gallop.   Pulmonary:      Effort: Pulmonary effort is normal. No respiratory distress.      Breath sounds: Normal breath sounds. No stridor. No wheezing, rhonchi or rales.   Abdominal:      General: Abdomen is flat. There is no distension.      Palpations: There is no mass.      Tenderness: There is no abdominal tenderness. There is no guarding or rebound.   Musculoskeletal:         General: No tenderness.      Right lower leg: No edema.      Left lower leg: No edema.   Skin:     Coloration: Skin is not jaundiced or pale.      Findings: No rash.   Neurological:      Comments: Minimal intention tremor left hand.     Psychiatric:         Mood and Affect: Mood normal.         Behavior: Behavior normal.       MoCA:  /30  CDT:    /5  PHQ9:   /27    Lab Results   Component Value Date    TSH 0.91 06/11/2025    TSH 1.67 08/02/2024    TSH 0.98 07/21/2021     No results found for: \"FREET4\"  Lab Results   Component Value Date    QRIMKHDM28 172 (L) 06/11/2025    DXTQLNTG16 291 08/04/2021     Lab Results   Component Value Date    HGBA1C 5.7 (H) 08/02/2024    HGBA1C 5.7 10/29/2019     No results found for: \"VITD25\"     No results found for this or any previous visit from the past 365 days.     No results found for this or any previous visit from the past 365 days.     MR brain wo IV contrast neuroquant protocol 04/28/2025    Narrative  Interpreted By:  Carlitos Tejada,  STUDY:  MR BRAIN WO IV CONTRAST NEUROQUANT PROTOCOL;  4/28/2025 12:56 pm    INDICATION:  Signs/Symptoms:Memory loss.    ,R41.3 Other amnesia    COMPARISON:  None.    ACCESSION NUMBER(S):  [id number]    ORDERING CLINICIAN:  COLTON POLK    TECHNIQUE:  Standard multiplanar multisequence MR imaging was performed through  the brain without intravenous contrast. Axial T2, FLAIR, DWI,  gradient echo T2, and volumetric T1 weighted images of brain were  acquired.  NeuroQuant evaluation obtained with detailed analysis  available in the imaging " jacket.    FINDINGS:  Parenchyma: There is no diffusion restriction abnormality to suggest  acute infarct.  No evidence of recent hemorrhage. No focal  microhemorrhages identified on GRE sequence. There is no mass effect  or midline shift. There is remote infarct involving the left  frontoparietal region with encephalomalacia involving the opercular  region. There is otherwise minimal/mild chronic small vessel ischemic  disease.    CSF Spaces: Subjectively there is mild-to-moderate generalized brain  atrophy. No hydrocephalus. Basilar cisterns are patent.    Per NeuroQuant analysis hippocampal volumes are at the 57th normative  percentile with whole-brain volume at the 42nd normative percentile.    Extra-axial spaces: No extra-axial fluid collection.    Paranasal Sinuses: Tiny left maxillary mucous retention cyst.  Otherwise the visualized paranasal sinuses are clear.    Mastoids: Clear.    Orbits: Bilateral native lens extractions.    Calvarium: No suspicious osseous marrow signal.    Impression  No acute infarct, recent hemorrhage, or intracranial mass effect.    Small remote cortical infarct within the left frontoparietal  opercular region. Otherwise minimal/mild chronic small vessel  ischemic disease.    Subjectively there is mild-to-moderate generalized brain atrophy. Per  NeuroQuant analysis: Hippocampal volumes are at the 57th normative  percentile.    MACRO:  None    Signed by: Carlitos Tejada 4/28/2025 2:40 PM  Dictation workstation:   [id number]        Assessment/Plan     Memory loss  - MoCA was 19 out of 30.  Patient had deficits in visual-spatial/executive functioning, attention, language, abstraction, and delayed recall.  Memory loss has been associated with falls.  MRI showed mild to moderate atrophy and left frontoparietal infarct.  Patient has also not been using his CPAP - declined referral for a sleep study.  Willing to start melatonin.  He reports being able to do all his ADLs and IADLs.  Patient  was advised to get labs done as below.  Given the MRI findings, patient likely has a vascular component contributing to his memory loss.    -     Referral to Geriatrics  -     Vitamin B12; Future - not done  -     Tsh - not done  -     MR brain wo IV contrast neuroquant protocol; Future - Small remote cortical infarct within the left frontoparietal  opercular region.  Mild - chronic small vessel ischemic disease, mild-moderate generalized brain atrophy.  - Increase rosuvastatin to 10mg.  - Continue Praluent - PCSK9 inhibitor - continue to follow with cardiology - will defer mgt to them.  - Start mediterranean diet  - Start exercising as tolerated.      MDD  No SI / HI  - increase lexapro to 10mg.    - continue Wellbutrin.    TANO (obstructive sleep apnea)  - refusing repeat sleep study post weight loss.  - has not been wearing CPAP.  - will continue to address if this is needed at next visit.      History of ETOH abuse  Last drink was 1 month ago.   -     thiamine (Vitamin B-1) 500 mg tablet; Take 1 tablet (500 mg) by mouth once daily.  - Encouraged AA f/u.    Sleep disturbance  -     melatonin 5 mg tablet; Take 1 tablet (5 mg) by mouth once daily at bedtime.  Patient declined referral to sleep medeicine.      Patient/family are agreeable to plan as above.  Patient/family advised to call with any worsening symptoms-they agree.  Shared decision making was implemented.  All patient's and family's questions were answered and concerns addressed.    Follow up in 1 month.    Alberto Hunter MD

## 2025-06-10 NOTE — PATIENT INSTRUCTIONS
Thank you for your visit today.  Please see instructions below  Increase lexapro to 10mg daily  Increase rosuvastatin to 10mg  Please have labs done - B12 and TSH  Continue to follow with cardiology  Start taking thiamine  Start mediteranean diet  Please call with any worsening symptoms  Please follow up in 1 month.

## 2025-06-12 ENCOUNTER — HOSPITAL ENCOUNTER (OUTPATIENT)
Dept: PAIN MEDICINE | Facility: CLINIC | Age: 80
Discharge: HOME | End: 2025-06-12
Payer: MEDICARE

## 2025-06-12 ENCOUNTER — TELEPHONE (OUTPATIENT)
Facility: HOSPITAL | Age: 80
End: 2025-06-12

## 2025-06-12 VITALS
RESPIRATION RATE: 16 BRPM | SYSTOLIC BLOOD PRESSURE: 129 MMHG | DIASTOLIC BLOOD PRESSURE: 74 MMHG | OXYGEN SATURATION: 97 % | HEART RATE: 94 BPM

## 2025-06-12 DIAGNOSIS — E53.8 B12 DEFICIENCY: Primary | ICD-10-CM

## 2025-06-12 DIAGNOSIS — M54.16 LUMBAR RADICULOPATHY: ICD-10-CM

## 2025-06-12 LAB
TSH SERPL-ACNC: 0.91 MIU/L (ref 0.4–4.5)
VIT B12 SERPL-MCNC: 172 PG/ML (ref 200–1100)

## 2025-06-12 PROCEDURE — 64484 NJX AA&/STRD TFRM EPI L/S EA: CPT | Performed by: ANESTHESIOLOGY

## 2025-06-12 PROCEDURE — 64483 NJX AA&/STRD TFRM EPI L/S 1: CPT | Performed by: ANESTHESIOLOGY

## 2025-06-12 PROCEDURE — 2550000001 HC RX 255 CONTRASTS: Performed by: ANESTHESIOLOGY

## 2025-06-12 PROCEDURE — 2500000004 HC RX 250 GENERAL PHARMACY W/ HCPCS (ALT 636 FOR OP/ED): Performed by: ANESTHESIOLOGY

## 2025-06-12 RX ORDER — LANOLIN ALCOHOL/MO/W.PET/CERES
1000 CREAM (GRAM) TOPICAL DAILY
Qty: 30 TABLET | Refills: 5 | Status: SHIPPED | OUTPATIENT
Start: 2025-06-12 | End: 2026-06-12

## 2025-06-12 RX ORDER — LIDOCAINE HYDROCHLORIDE 5 MG/ML
INJECTION, SOLUTION INFILTRATION; INTRAVENOUS AS NEEDED
Status: COMPLETED | OUTPATIENT
Start: 2025-06-12 | End: 2025-06-12

## 2025-06-12 RX ORDER — INDOMETHACIN 25 MG/1
CAPSULE ORAL AS NEEDED
Status: COMPLETED | OUTPATIENT
Start: 2025-06-12 | End: 2025-06-12

## 2025-06-12 RX ORDER — CYANOCOBALAMIN (VITAMIN B-12) 250 MCG
250 TABLET ORAL DAILY
Qty: 30 TABLET | Refills: 5 | Status: SHIPPED | OUTPATIENT
Start: 2025-06-12 | End: 2025-06-12

## 2025-06-12 RX ORDER — DEXAMETHASONE SODIUM PHOSPHATE 10 MG/ML
INJECTION INTRAMUSCULAR; INTRAVENOUS AS NEEDED
Status: COMPLETED | OUTPATIENT
Start: 2025-06-12 | End: 2025-06-12

## 2025-06-12 RX ADMIN — IOHEXOL 5 ML: 240 INJECTION, SOLUTION INTRATHECAL; INTRAVASCULAR; INTRAVENOUS; ORAL at 11:05

## 2025-06-12 RX ADMIN — SODIUM BICARBONATE 1 MEQ: 84 INJECTION, SOLUTION INTRAVENOUS at 11:05

## 2025-06-12 RX ADMIN — LIDOCAINE HYDROCHLORIDE 9 ML: 5 INJECTION, SOLUTION INFILTRATION at 11:04

## 2025-06-12 RX ADMIN — DEXAMETHASONE SODIUM PHOSPHATE 10 MG: 10 INJECTION, SOLUTION INTRAMUSCULAR; INTRAVENOUS at 11:05

## 2025-06-12 ASSESSMENT — PAIN DESCRIPTION - DESCRIPTORS: DESCRIPTORS: ACHING

## 2025-06-12 ASSESSMENT — PAIN SCALES - GENERAL
PAINLEVEL_OUTOF10: 4
PAINLEVEL_OUTOF10: 7

## 2025-06-12 ASSESSMENT — PAIN - FUNCTIONAL ASSESSMENT: PAIN_FUNCTIONAL_ASSESSMENT: 0-10

## 2025-06-12 NOTE — H&P
"Pain Management H&P    History Of Present Illness  Delano Aguilar \"Saulo\" is a 80 y.o. male presents for procedure state below. Endorses no changes in past medical history or medical health since last seen in clinic.      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    Surgical History  He has a past surgical history that includes Other surgical history (09/10/2021); Other surgical history (09/10/2021); Other surgical history (12/29/2019); US guided thyroid biopsy (8/18/2014); Tonsillectomy (01/22/2015); and Sinus surgery (01/22/2015).     Social History  He reports that he has been smoking cigars. He has never used smokeless tobacco. He reports current alcohol use. He reports that he does not use drugs.    Family History  Family History[1]     Allergies  Patient has no known allergies.    Review of Symptoms:   Constitutional: Negative for chills, diaphoresis or fever  HENT: Negative for neck swelling  Eyes:.  Negative for eye pain  Respiratory:.  Negative for cough, shortness of breath or wheezing    Cardiovascular:.  Negative for chest pain or palpitations  Gastrointestinal:.  Negative for abdominal pain, nausea and vomiting  Genitourinary:.  Negative for urgency  Musculoskeletal:  Positive for back pain. Positive for joint pain. Denies falls within the past 3 months.  Skin: Negative for wounds or itching   Neurological: Negative for dizziness, seizures, loss of consciousness and weakness  Endo/Heme/Allergies: Does not bruise/bleed easily  Psychiatric/Behavioral: Negative for depression. The patient does not appear anxious.     "   PHYSICAL EXAM  Vitals signs reviewed  Constitutional:       General: Not in acute distress     Appearance: Normal appearance. Not ill-appearing.  HENT:     Head: Normocephalic and atraumatic  Eyes:     Conjunctiva/sclera: Conjunctivae normal  Cardiovascular:     Rate and Rhythm: Normal rate and regular rhythm  Pulmonary:     Effort: No respiratory distress  Abdominal:     Palpations: Abdomen is soft  Musculoskeletal: PECK  Skin:     General: Skin is warm and dry  Neurological:     General: No focal deficit present  Psychiatric:         Mood and Affect: Mood normal         Behavior: Behavior normal     Last Recorded Vitals  /81   Pulse 75   Resp 20     Relevant Results  Current Outpatient Medications   Medication Instructions    alirocumab (Praluent Pen) 150 mg/mL pen injector INJECT 150 MG (1 PEN) BENEATH THE SKIN ONCE EVERY TWO WEEKS.    aspirin 81 mg EC tablet 1 tablet, Daily    buPROPion XL (WELLBUTRIN XL) 300 mg, oral, Daily    escitalopram (LEXAPRO) 10 mg, oral, Daily    ketoconazole (NIZOral) 2 % cream Apply Topically a thin a layer to affected areas twice daily    losartan (COZAAR) 50 mg, oral, Daily    melatonin 5 mg, oral, Nightly    metFORMIN (GLUCOPHAGE) 1,000 mg, oral, 2 times daily (morning and late afternoon)    pantoprazole (ProtoNix) 40 mg EC tablet TAKE ONE TABLET BY MOUTH DAILY    rosuvastatin (CRESTOR) 10 mg, oral, Daily    thiamine (VITAMIN B-1) 500 mg, oral, Daily    topiramate (TOPAMAX) 100 mg, oral, Nightly    triamcinolone (Kenalog) 0.1 % cream Apply and rub in a thin film to affected areas twice daily (AM and PM)         MR lumbar spine wo IV contrast 12/01/2023    Narrative  Interpreted By:  Ela Syed,  STUDY:  MRI of the lumbar spine without IV contrast;  12/1/2023 1:28 pm    INDICATION:  Signs/Symptoms:severe back and leg pain.    COMPARISON:  05/17/2023.    ACCESSION NUMBER(S):  [id number]    ORDERING CLINICIAN:  RAMO MANUEL    TECHNIQUE:  Sagittal and axial STIR and  T1-weighted MRI images of the lumbar  spine were acquired using a spondylolysis protocol.  No contrast was  administered.    FINDINGS:  For counting purposes the last lumbarized vertebral body is labeled  L5. There is trace retrolisthesis of L1 on L2 and L2 on L3.  Alignment, vertebral body heights and marrow signal pattern are  otherwise within normal limits. There is desiccated disc signal  throughout the lumbar spine with moderate to severe disc height loss  at L1-L2 and moderate disc height loss at T12-L1, L2-L3 and L5-S1.  The conus terminates at L1 and is unremarkable. There are interval  postsurgical changes of laminectomy at L1-L2. There is nonspecific  edema within the surgical bed which is likely postsurgical.    Evaluation by level:    The axial images are degraded by patient motion artifact.    T12-L1: Disc bulge and facet arthrosis. Mild spinal canal and neural  foraminal stenosis.    L1-L2: Status post laminectomy. Disc bulge and facet arthrosis. No  significant spinal canal stenosis. Moderate bilateral neural  foraminal stenosis.    L2-L3: Disc bulge and facet arthrosis. Moderate spinal canal  stenosis, narrowing of the bilateral subarticular recess, mild right  and moderate left neural foraminal stenosis.    L3-L4: Disc bulge and facet arthrosis. No significant spinal canal  stenosis, narrowing of the bilateral subarticular recess and moderate  bilateral neural foraminal stenosis.    L4-L5: Disc bulge and facet arthrosis. No significant spinal canal  stenosis, narrowing of the bilateral subarticular recess, severe  right and moderate left neural foraminal stenosis.    L5-S1: Disc bulge eccentric to the right and facet arthrosis. Mild  spinal canal stenosis, mild narrowing of the subarticular recess,  right greater than left, severe neural foraminal stenosis.    Impression  The axial images are degraded by patient motion artifact. Within this  limitation, there are interval postsurgical changes of  laminectomy at  L1-L2 without significant spinal canal stenosis and moderate  bilateral neural foraminal stenosis.    Multilevel degenerative changes most pronounced at L2-L3 with  moderate spinal canal stenosis.    There is severe bilateral neural foraminal stenosis at L5-S1.    I personally reviewed the images/study and I agree with the findings  as stated. This study was interpreted at [place], [place].    MACRO:  None    Signed by: Ela Syed 12/1/2023 2:54 PM  Dictation workstation:   [id number]      MR lumbar spine wo IV contrast 05/17/2023    Narrative  Interpreted By:  SAMANTHA COHEN MD  MRN: [id number]  Patient Name: ZENON GOMEZ    STUDY:  MRI L-SPINE WO    INDICATION:  back pain  M54.17: Lumbosacral neuritis M51.36: Lumbar degenerative  disc disease M48.061: Lumbar stenosis    COMPARISON:  Lumbar spine MRI 07/24/2020.    ACCESSION NUMBER(S):  [id number]    ORDERING CLINICIAN:  RAMO MANUEL    TECHNIQUE:  Multiplanar multisequence MRI of the lumbar spine was performed  without the administration of intravenous contrast, according to  standard protocol.    FINDINGS:  ALIGNMENT: Straightening of usual lumbar lordosis. Slight  retrolisthesis of L2 on L3. 3 mm grade 1 anterolisthesis of L5 on S1.    VERTEBRAE: No acute fracture or aggressive osseous lesion. Vertebral  body heights are preserved.    DISCS: Moderate disc height loss at L1-2 and mild disc height loss at  L5-S1.    CONUS MEDULLARIS AND CAUDA EQUINA: The conus medullaris terminates at  L1. Compression cauda equina nerve roots at L1-2 with redundancy of  the cauda equina nerve roots proximally.    PARAVERTEBRAL SOFT TISSUES AND VISUALIZED RETROPERITONEUM: The  visualized paravertebral soft tissues appear within normal limits.    EVALUATION OF INDIVIDUAL LEVELS:  L1-2: Disc bulge with facet hypertrophy results in severe narrowing  of the spinal canal and mass effect on the cauda equina nerve  roots.  There is mild bilateral foraminal stenosis.    L2-3: Slight retrolisthesis with disc osteophyte complex asymmetric  to the left and facet hypertrophy. There is moderate narrowing of the  spinal canal and left neural foramen. There is mild narrowing of the  right neural foramen.    L3-4: Partially calcified disc bulge with facet hypertrophy. There is  mild narrowing of the spinal canal and bilateral foramina.    L4-5: Disc bulge asymmetric to the right with facet hypertrophy.  There is moderate right and mild left foraminal stenosis. There is  mild narrowing of the spinal canal and crowding of the right  subarticular recess.    L5-S1: Disc bulge with facet hypertrophy. There is moderate to severe  right and moderate left foraminal stenosis. There is mild narrowing  of the spinal canal secondary to superimposed epidural lipomatosis.    LIMITED EVALUATION OF UPPER SACRUM AND SACROILIAC JOINTS: Mild  degenerative changes of the sacroiliac joints.    Impression  Multilevel degenerative changes of the lumbar spine most prominent at  L1-2 where there is severe narrowing of the spinal canal mass-effect  on the cauda equina nerve roots, slightly progressed compared to  prior MRI 07/24/2020. Redundancy of the cauda equina nerve roots  proximal to this level noted.      MR lumbar spine wo IV contrast 11/10/2022    Narrative  * * *Final Report* * *    DATE OF EXAM: Nov 10 2022  1:21PM    ANM   0303  -  MRI LUMBAR SPINE WO IVCON  / ACCESSION #  [id number]    PROCEDURE REASON: multiple diagnoses    * * * * Physician Interpretation * * * *    EXAMINATION:  MRI LUMBAR SPINE WO IVCON    CLINICAL HISTORY:  Spinal stenosis of lumbar region with neurogenic  claudication Spinal stenosis, lumbar region with neurogenic claudication    TECHNIQUE: Routine lumbosacral spine MR protocol without gadolinium.  MQ:  [id number]    COMPARISON: 11/10/2022.    RESULT:    Counting reference:  Lumbosacral junction.  For the purposes of  this  report,  L4-5 is considered the level of the iliac crest and assume there  are 5 lumbar-type vertebrae.  Anatomic variant:  None.    Localizer images:  No additional findings.    Alignment:    Grade 1 retrolisthesis of L1 motion to L2, and L2 relation  to L3. This is most likely secondary to degenerative facet disease.    Bone marrow signal/fracture:  No evidence of pathologic marrow  infiltration.  No evidence of prior fracture.    Conus:  The conus is within normal limits of signal intensity and  morphology. There is component of congenital spinal canal narrowing.    T12-L1:  Posterior disc osteophyte complex asymmetric to right with  bilateral facet degenerative changes. Mild right neural foraminal  stenosis. Moderate right lateral recess stenosis. Mild spinal canal  stenosis.    L1-L2:    Posterior disc osteophyte complex with bilateral facet  degenerative changes. This is superimposed on congenital spinal canal  narrowing. Moderate bilateral neural foraminal stenosis. Severe bilateral  lateral recess. Severe spinal canal stenosis with mass effect on cauda  equina.    L2-L3:    Posterior disc osteophyte complex with bilateral facet  degenerative changes. Superimposed central and left central disc  herniation with slightly inferior migration. Bilateral ligamentum flavum  thickening. Moderate left neural foraminal stenosis. Moderate right  neural foraminal stenosis. Severe bilateral lateral recess stenosis.  Severe spinal canal stenosis.    L3-L4:    Posterior disc osteophyte complex with bilateral facet  degenerative changes. Small superimposed disc herniation. Moderate  bilateral neural foraminal stenosis. Moderate right lateral recess  stenosis. Mild left lateral recess stenosis. No significant spinal canal  stenosis.    L4-L5:    Mild broad disc bulge with bilateral facet degenerative  changes. Moderate to severe right neural foraminal stenosis. Moderate  left neural foraminal stenosis. Mild-to-moderate  bilateral lateral recess  stenosis. No significant spinal canal stenosis..    L5-S1:    Posterior disc osteophyte complex with superimposed right  central disc extrusion. Slight superior and inferior migration of the  extruded disc. Severe bilateral neural foraminal stenosis. Severe right  lateral recess stenosis. Mild-to-moderate left lateral recess stenosis.  Severe thecal sac narrowing, partly secondary to epidural lipomatosis and  the tapering of the thecal sac.    Impression  IMPRESSION:    1. Multilevel degenerative changes of the lumbar spine, with multiple  levels of severe spinal canal stenosis and bilateral lateral recess  stenosis secondary to degenerative disc disease, facet degenerative  changes, and congenital narrowing of the spinal canal, as detailed above.    2. Specifically, moderate right central disc extrusion at L5-S1, with  resulting severe right lateral recess stenosis. Severe bilateral neural  foraminal stenosis. Severe thecal sac narrowing, partly secondary to  epidural lipomatosis and tapering of thecal sac.    Anatomic Thoracic/Lumbar Variant: None.  L4-5 is considered the level of  the iliac crest and assume there are 5 lumbar-type vertebrae.                      : [facility]  Transcribe Date/Time: Nov 15 2022 11:08A    Dictated by : JOAN VELA MD    This examination was interpreted and the report reviewed and  electronically signed by:  JOAN VELA MD on Nov 15 2022 11:30AM  EST     Sacroiliac Joint Injection  Table formatting from the original result was not included.  Procedure  Sacroiliac Joint Injection    Indication  Sacroiliitis (CMS-HCC)    Medications  lidocaine PF (Xylocaine) 5 mg/mL (0.5 %) injection 9 mL   sodium bicarbonate injection 1 mEq   methylPREDNISolone acetate (DEPO-Medrol) injection 40 mg   iohexol (OMNIPaque) 240 mg iodine/mL solution 5 mL   ropivacaine (Naropin) 5 mg/mL (0.5 %) injection 3 mL   (Totals for administrations occurring from 1343 to 1359  "on 04/24/25)     Preprocedure  A history and physical has been performed, and patient medication   allergies have been reviewed. The patient's tolerance of previous   anesthesia has been reviewed. The risks and benefits of the procedure and   the sedation options and risks were discussed with the patient. All   questions were answered and informed consent obtained.    Details of the Procedure  History reviewed patient interviewed and the examined.  Risks and benefits   from procedure discussed patient agreed to proceed and consent was signed.    Timeout was done in the operating room.  With the the patient in the   prone position the right lower back was prepped and draped in a sterile   fashion.  Fluoroscopic guidance in an AP view the distal end of the   sacroiliac joint was identified.  An ipsilateral oblique view was used as   a trajectory view.  Lidocaine 0.5% was used for skin infiltration.  A   25-gauge 3.5 inch spinal needle was introduced into the medial border of   the sacroiliac joints.  The needle was positioned to slip into   theIntra-articular space.  Contrast showed adequate intraarticular spread   without any vascular uptake.  Mixture of 0.5% ropivacaine mixed with 40 mg   of Depo-Medrol was used and 2 cc was injected  Needles were removed dressing applied patient tolerated the procedure   without any complication and was transferred to the recovery room in   stable condition.     Procedure Provider  Yonatan Frederick MD    Procedure Location  [address] [place] [address]    Referring Provider  Yonatan Frederick MD  [address] [place] [address]       1. Lumbar radiculopathy  Transforaminal    Transforaminal    FL pain management    [place] pain management           ASSESSMENT/PLAN  Delano PINZON Jeff \"Saulo\" is a 80 y.o. male presenting for right L4, L5 lumbar transforaminal epidural steroid injection     Patient denies any recent " antibiotic use or infections, denies any blood thinner use, and denies contrast or local anesthetic allergies     Risks, benefits, alternatives discussed. All questions answered to the best of my ability. Patient agrees to proceed.      Our plan is as follows:  - Proceed with aforementioned procedure          Georgia Burgos DO   Pain fellow          [1]   Family History  Problem Relation Name Age of Onset    Alzheimer's disease Mother      Blindness Mother          Legally ([place] Definition)    Colon cancer Father      Heart failure Father      Fibromyalgia Sister

## 2025-06-12 NOTE — DISCHARGE INSTRUCTIONS
[facility]  [facility] [address]  [address] [id number] [phone]    POST PROCEDURE INSTRUCTIONS    Activity  Medication used during your procedure may cause some temporary weakness or numbness in your arms or legs, depending on the type of injection you received. It is recommended that you do not drive or operate machinery the day of your injection.  You do not need to stay in bed when you get home. You should be able to resume your normal activities, including work. However, any pre-existing physical restriction you had prior to the procedure may still remain.   Have a responsible adult with you if you received sedation for the procedure. Do not drive or operate machinery for 12 hours.    Pain  Immediate pain relief from the local anesthetic will wear off after several hours.  Prolonged relief from the steroid may take 3-14 days to occur.  Some patients may experience a “flare up” of their normal pain for a few days after the procedure. You may apply ice packs to the sore area for 15minutes on/ 2 hours off and take your prescribed or over the counter analgesics as needed.  Common side effects from the steroid include facial flushing, headaches,fluid retention,trouble sleeping,and cold like symptoms for 24-48 hours post procedure.  Patients receiving diagnostic injections (no steroid): pain relief is intended to be temporary. Pay attention to the percentage of pain relief that occurs compared to before your injection so you can report this to your doctor. Pain scores before and after the procedure need to be documented.    Medications  Resume your normal medications unless otherwise instructed  If you held your blood thinning medication for the procedure,you will be instructed on when to restart.    Injection site care  Keep the injection site clean and dry. You may shower and remove the Band Aid after you arrive home.  If you notice excessive  bleeding (slow general oozing that completely soaks the dressing or fresh bright red bleeding),apply pressure and call our office immediately.  Observe for signs of infection: increasing pain at injection site, redness, swelling, drainage with a foul smell, any associated fever or chills                        If you notice any of these, call our office immediately.    Diabetic patients   You may notice an elevation in blood sugar if steroid is used. Notify your primary care doctor if blood sugar levels do not return to normal.    Follow up  Call office to schedule  injection follow up appointment  with Dr. Frederick in 3-4 weeks      Call our office at [phone] to speak to the clinical staff with any concerns or problems.  Go to the nearest emergency room if you are not able to reach us and your problem is urgent.  Call [phone] if you develop serious symptoms such as: chest pain or difficulty breathing.

## 2025-06-12 NOTE — TELEPHONE ENCOUNTER
Labs showed B12 deficiency.  I started patient on repletion.  Can you please call and let the patient know?  Thank you.

## 2025-06-17 ENCOUNTER — TELEPHONE (OUTPATIENT)
Dept: GERIATRIC MEDICINE | Facility: CLINIC | Age: 80
End: 2025-06-17
Payer: MEDICARE

## 2025-06-17 PROCEDURE — RXMED WILLOW AMBULATORY MEDICATION CHARGE

## 2025-06-17 NOTE — TELEPHONE ENCOUNTER
Mr. Gupta said that you told him to call if he needed additional help. He has been feeling gloomy and unable to sleep well. He would like you to give him a call.

## 2025-06-18 ENCOUNTER — PHARMACY VISIT (OUTPATIENT)
Dept: PHARMACY | Facility: CLINIC | Age: 80
End: 2025-06-18
Payer: COMMERCIAL

## 2025-06-20 NOTE — TELEPHONE ENCOUNTER
Tried to call and speak to patient today.  Went to voicemail.  HIPAA compliant voicemail was left was left.  Can you please follow-up with him to make sure he is doing okay?  Thank you.

## 2025-06-24 DIAGNOSIS — E66.09 CLASS 1 OBESITY DUE TO EXCESS CALORIES WITHOUT SERIOUS COMORBIDITY WITH BODY MASS INDEX (BMI) OF 34.0 TO 34.9 IN ADULT: ICD-10-CM

## 2025-06-24 DIAGNOSIS — R21 RASH: ICD-10-CM

## 2025-06-24 DIAGNOSIS — K21.9 GASTRO-ESOPHAGEAL REFLUX DISEASE WITHOUT ESOPHAGITIS: ICD-10-CM

## 2025-06-24 DIAGNOSIS — E66.811 CLASS 1 OBESITY DUE TO EXCESS CALORIES WITHOUT SERIOUS COMORBIDITY WITH BODY MASS INDEX (BMI) OF 34.0 TO 34.9 IN ADULT: ICD-10-CM

## 2025-06-25 RX ORDER — TOPIRAMATE 100 MG/1
100 TABLET, FILM COATED ORAL NIGHTLY
Qty: 90 TABLET | Refills: 1 | Status: SHIPPED | OUTPATIENT
Start: 2025-06-25 | End: 2025-12-22

## 2025-06-25 RX ORDER — PANTOPRAZOLE SODIUM 40 MG/1
40 TABLET, DELAYED RELEASE ORAL DAILY
Qty: 90 TABLET | Refills: 1 | Status: SHIPPED | OUTPATIENT
Start: 2025-06-25

## 2025-06-25 RX ORDER — KETOCONAZOLE 20 MG/G
CREAM TOPICAL
Qty: 60 G | Refills: 1 | Status: SHIPPED | OUTPATIENT
Start: 2025-06-25

## 2025-06-25 RX ORDER — METFORMIN HYDROCHLORIDE 1000 MG/1
1000 TABLET ORAL
Qty: 180 TABLET | Refills: 1 | Status: SHIPPED | OUTPATIENT
Start: 2025-06-25 | End: 2026-06-25

## 2025-07-01 ENCOUNTER — PHARMACY VISIT (OUTPATIENT)
Dept: PHARMACY | Facility: CLINIC | Age: 80
End: 2025-07-01
Payer: COMMERCIAL

## 2025-07-07 ENCOUNTER — TELEPHONE (OUTPATIENT)
Dept: PAIN MEDICINE | Facility: CLINIC | Age: 80
End: 2025-07-07
Payer: MEDICARE

## 2025-07-07 NOTE — TELEPHONE ENCOUNTER
Patient said injection helped 70% for about 1 1/2 weeks except when trying to sleep then his pain is at least a six out of ten.   Please call patient he had some other questions re: xrays

## 2025-07-08 ENCOUNTER — APPOINTMENT (OUTPATIENT)
Facility: CLINIC | Age: 80
End: 2025-07-08
Payer: MEDICARE

## 2025-07-09 DIAGNOSIS — M54.17 LUMBOSACRAL NEURITIS: ICD-10-CM

## 2025-07-09 DIAGNOSIS — M48.07 LUMBOSACRAL SPINAL STENOSIS: ICD-10-CM

## 2025-07-09 NOTE — TELEPHONE ENCOUNTER
Transient relief from S1 TFE  done 6/12/25 (attempted L4,L5). Pain back to baseline. SI joint did not help. Last MRI was Pre surgery in 2023. Please advise if you want to see him in the office first vs order updated imaging.

## 2025-07-15 ENCOUNTER — HOSPITAL ENCOUNTER (OUTPATIENT)
Dept: RADIOLOGY | Facility: HOSPITAL | Age: 80
Discharge: HOME | End: 2025-07-15
Payer: MEDICARE

## 2025-07-15 DIAGNOSIS — M48.07 LUMBOSACRAL SPINAL STENOSIS: ICD-10-CM

## 2025-07-15 DIAGNOSIS — M54.17 LUMBOSACRAL NEURITIS: ICD-10-CM

## 2025-07-15 PROCEDURE — 72148 MRI LUMBAR SPINE W/O DYE: CPT

## 2025-07-15 PROCEDURE — 72148 MRI LUMBAR SPINE W/O DYE: CPT | Performed by: RADIOLOGY

## 2025-07-16 DIAGNOSIS — M51.27 HERNIATED NUCLEUS PULPOSUS, L5-S1, RIGHT: ICD-10-CM

## 2025-07-17 ENCOUNTER — DOCUMENTATION (OUTPATIENT)
Dept: PAIN MEDICINE | Facility: CLINIC | Age: 80
End: 2025-07-17
Payer: MEDICARE

## 2025-07-17 DIAGNOSIS — M96.1 POSTLAMINECTOMY SYNDROME, LUMBAR REGION: ICD-10-CM

## 2025-07-17 RX ORDER — OXYCODONE AND ACETAMINOPHEN 5; 325 MG/1; MG/1
1 TABLET ORAL 3 TIMES DAILY PRN
Qty: 30 TABLET | Refills: 0 | Status: SHIPPED | OUTPATIENT
Start: 2025-07-17 | End: 2025-07-29

## 2025-07-17 NOTE — PROGRESS NOTES
DR Frederick reviewed MRI. Recommends return to Dr Burnett for surgical opinion. Pt aware and will call his office to schedule

## 2025-07-25 NOTE — PROGRESS NOTES
"  Pharmacist Clinic: Cardiology Management    Delano Aguilar \"Saulo\" is a 80 y.o. male was referred to Clinical Pharmacy Team for Cholesterol management.     Referring Provider: Navin Hinds MD    THIS IS A FOLLOW UP PATIENT APPOINTMENT. AT LAST VISIT ON 5/20/25 WITH PHARMACIST (Joana Blevins).    Appointment was completed by Delano \"Saulo\" Jeff who was reached at primary contact number.    REVIEW OF PAST APPNT (IF APPLICABLE):   Saulo reports no adverse effects with Praluent and rosuvastatin. Lipid panel from August 2024 showed slight elevation of triglycerides, LDL was at goal. Recommend repeat panel, as rosuvastatin was started after these labs were completed.   Evaluation of lipid panel and  PAP renewal review    Allergies Reviewed? Yes    Allergies[1]    Medical History[2]    Medications Ordered Prior to Encounter[3]      RELEVANT LAB RESULTS:  Lab Results   Component Value Date    BILITOT 0.4 08/02/2024    CALCIUM 9.6 08/02/2024    CO2 21 08/02/2024     (H) 08/02/2024    CREATININE 0.77 08/02/2024    GLUCOSE 98 08/02/2024    ALKPHOS 76 08/02/2024    K 4.4 08/02/2024    PROT 7.0 08/02/2024     08/02/2024    AST 18 08/02/2024    ALT 17 08/02/2024    BUN 20 08/02/2024    ANIONGAP 14 08/02/2024    MG 2.70 (H) 11/14/2019    PHOS 3.8 11/14/2019    ALBUMIN 4.4 08/02/2024    GFRMALE >90 06/02/2023     Lab Results   Component Value Date    TRIG 202 (H) 08/02/2024    CHOL 133 08/02/2024    LDLCALC 48 08/02/2024    HDL 45.0 08/02/2024     No results found for: \"BMCBC\", \"CBCDIF\"     PHARMACEUTICAL ASSESSMENT:    MEDICATION RECONCILIATION    Was a medication reconciliation completed at this visit? Yes  Home Pharmacy Reviewed? Yes, describe: [facility] [place] and  specialty for praluent    Added:  - None  Changed:  - None  Removed:  - None    Drug Interactions? No    Medication Documentation Review Audit       Reviewed by Jack Barboza PharmD (Pharmacist) on 07/29/25 at 1006  "     Medication Order Taking? Sig Documenting Provider Last Dose Status   alirocumab (Praluent Pen) 150 mg/mL pen injector [id number] Yes INJECT 150 MG (1 PEN) BENEATH THE SKIN ONCE EVERY TWO WEEKS. Navin Hinds MD  Active   aspirin 81 mg EC tablet [id number] Yes Take 1 tablet (81 mg) by mouth once daily. Laurie Todd MD  Active   buPROPion XL (Wellbutrin XL) 300 mg 24 hr tablet [id number] Yes TAKE 1 TABLET BY MOUTH EVERY DAY Isaac Gallegos DO  Active   cyanocobalamin (Vitamin B-12) 1,000 mcg tablet [id number] Yes Take 1 tablet (1,000 mcg) by mouth once daily. Alberto Hunter MD  Active   escitalopram (Lexapro) 5 mg tablet [id number] Yes Take 2 tablets (10 mg) by mouth once daily. Alberto Hunter MD  Active   ketoconazole (NIZOral) 2 % cream [id number] Yes Apply Topically a thin a layer to affected areas twice daily Isaac Gallegos DO  Active   losartan (Cozaar) 50 mg tablet [id number] Yes TAKE 1 TABLET BY MOUTH DAILY Navin Hinds MD  Active   melatonin 5 mg tablet [id number]  Take 1 tablet (5 mg) by mouth once daily at bedtime.   Patient not taking: Reported on 7/29/2025    Alberto Hunter MD  Active   metFORMIN (Glucophage) 1,000 mg tablet [id number] Yes Take 1 tablet (1,000 mg) by mouth 2 times a day with meals. Isaac Gallegos DO  Active   oxyCODONE-acetaminophen (Percocet) 5-325 mg tablet [id number] Yes Take 1 tablet by mouth 3 times a day as needed for severe pain (7 - 10) for up to 10 days. Yonatan Frederick MD  Active   pantoprazole (ProtoNix) 40 mg EC tablet [id number] Yes TAKE ONE TABLET BY MOUTH DAILY Isaac Gallegos DO  Active   rosuvastatin (Crestor) 10 mg tablet [id number] Yes Take 1 tablet (10 mg) by mouth once daily. Alberto Hunter MD  Active   thiamine (Vitamin B-1) 500 mg tablet [id number] Yes Take 1 tablet (500 mg) by mouth once daily. Alberto Hunter MD  Active   topiramate (Topamax) 100 mg tablet [id number] Yes Take 1 tablet (100 mg) by mouth once daily at bedtime. Isaac Gallegos, DO  Active    triamcinolone (Kenalog) 0.1 % cream [id number] Yes Apply and rub in a thin film to affected areas twice daily (AM and PM) Historical Provider, MD  Active                    DISEASE MANAGEMENT ASSESSMENT:     HYPERCHOLESTEROLEMIA ASSESSMENT    RECENT LIPID PANEL (DATE): 8/2/24  Lab Results   Component Value Date    TRIG 202 (H) 08/02/2024    CHOL 133 08/02/2024    LDLCALC 48 08/02/2024    HDL 45.0 08/02/2024          ASCVD SCORE: The ASCVD Risk score (Montse BENITEZ, et al., 2019) failed to calculate for the following reasons:    Risk score cannot be calculated because patient has a medical history suggesting prior/existing ASCVD  Coronary Heart Disease (MI, angina, coronary artery stenosis): Yes   History of ischemic stroke? No   History of carotid artery stenosis? No   Peripheral artery disease? No   ASCVD RISK FACTORS:    CKD? No   Diabetes? No   HTN? Yes   Persistently elevated LDL? No   Elevated triglycerides? Yes   Inflammatory diseases (rheumatoid arthritis, psoriasis, HIV)? No    CURRENT PHARMACOTHERAPY  - Statin? Yes, describe: Rosuvastatin 10mg daily  - Ezetimibe? No  - PCSK9-I? Yes, describe: Praluent 150mg every 14 days  - Other lipid lowering agents? No      RELEVANT PAST MEDICAL HISTORY:   HTN, HLD, A-fib, Hx of NSTEMI, HF, Hx of PE     ASSESSMENT    Affordability/Accessibility:  PAP active through 8/26/2025  Adherence/Organization: AM/PM pill box, no adherence issues  Adverse Effects: Occasional diarrhea but has this issue in the past  Recent Hospitalizations: No  Diet: Diet has been decent, does not eat as much as he use too. Irregular schedule, just eats when he is hungry  Exercise: No, but does take his dog to park but does have some back pain that limits him and his exercise  Tobacco Use: Yes, describe: Occasional cigars  Alcohol Use: No  Next Lipid Panel: Is ordered and pending. Made patient aware of pending labs    EDUCATION/COUNSELING:  - Counseled patient on MOA, expectations, side effects,  duration of therapy, contraindications, administration, and monitoring parameters  - Answered all patient questions and concerns     Patient Assistance Program (PAP) - RENEWAL     Per regulation, patient is due for their annual renewal for their  PAP application. Patient's application is due for renewal by 8/26/25. Patient understands that if proper documentation is not received before renewal date, they will no longer be able to receive approved medication(s) free of charge.     Application for program to be submitted for the following medications: [facility] Permanent Address: [facility]   Prescription Insurance:   Yes   Members of Household: 1   Files Taxes: Yes       Patient will be email financial information to pharmacist directly at [email or web address].    Patient verbally reports monthly or yearly income which is less than 400% federal poverty level    Patient aware this process may take up to 6 weeks.     If approved medication must be filled through [facility] and MEDICATION WILL BE MAILED TO PATIENT.     DISCUSSION/NOTES:   Reviewed renewal for PAP assistance for Praluent  Patient reports no adverse effects of medications  Counseled patient on PAP program and provided information for patient to send tax information in  Patient made aware that he has a pending lipid panel and due for those labs. Patient verbally stated that he will go get labs drawn  Will follow up in 1 month to assess PAP status    ASSESSMENT:    Assessment/Plan   Problem List Items Addressed This Visit       Hyperlipidemia - Primary    Lipid panel from 8/2024 shows LDL at goal but increased triglycerides at 202 mg/dL. Recommended patient get lipid panel drawn prior to next follow up. Continue Praluent and rosuvastatin unchanged           Relevant Orders    Referral to Clinical Pharmacy         RECOMMENDATIONS/PLAN:    Continue:  Praluent 150 mg every 14 days  Rosuvastatin 10 mg every day   Follow up: 1  month    Last Appnt with Referring Provider: 4/23/25  Next Appnt with Referring Provider: 10/22/25  Clinical Pharmacist follow up: 8/19/25 @ 10AM  VAF/Application Expiration: Yes    Date: 8/26/25  Type of Encounter: Virtual    Thank you,  Jack Barboza, PharmD  Clinical Pharmacy Specialist, Cardiology  [phone]    Verbal consent to manage patient's drug therapy was obtained from the patient . They were informed they may decline to participate or withdraw from participation in pharmacy services at any time.    Continue all meds under the continuation of care with the referring provider and clinical pharmacy team.            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    Mixed hearing loss, bilateral    Old myocardial infarction     History of non-ST elevation myocardial infarction (NSTEMI)    Other conditions influencing health status     Leukemia    Personal history of malignant neoplasm, unspecified     History of malignant neoplasm    Personal history of other endocrine, nutritional and metabolic disease     History of thyroid disease    Personal history of other specified conditions     History of heartburn   [3]   Current Outpatient Medications on File Prior to Visit   Medication Sig Dispense Refill    alirocumab (Praluent Pen) 150 mg/mL pen injector INJECT 150 MG (1 PEN) BENEATH THE SKIN ONCE EVERY TWO WEEKS. 6 mL 3    aspirin 81 mg EC tablet Take 1 tablet (81 mg) by mouth once daily.      buPROPion XL (Wellbutrin XL) 300 mg 24 hr tablet TAKE 1 TABLET BY MOUTH EVERY DAY 90 tablet 1    cyanocobalamin (Vitamin B-12) 1,000 mcg tablet Take 1 tablet (1,000 mcg) by mouth once daily. 30 tablet 5    escitalopram (Lexapro) 5 mg tablet Take 2 tablets (10 mg) by mouth once daily. 90 tablet 2    ketoconazole (NIZOral) 2 % cream Apply Topically a  thin a layer to affected areas twice daily 60 g 1    losartan (Cozaar) 50 mg tablet TAKE 1 TABLET BY MOUTH DAILY 90 tablet 3    metFORMIN (Glucophage) 1,000 mg tablet Take 1 tablet (1,000 mg) by mouth 2 times a day with meals. 180 tablet 1    oxyCODONE-acetaminophen (Percocet) 5-325 mg tablet Take 1 tablet by mouth 3 times a day as needed for severe pain (7 - 10) for up to 10 days. 30 tablet 0    pantoprazole (ProtoNix) 40 mg EC tablet TAKE ONE TABLET BY MOUTH DAILY 90 tablet 1    rosuvastatin (Crestor) 10 mg tablet Take 1 tablet (10 mg) by mouth once daily. 90 tablet 3    thiamine (Vitamin B-1) 500 mg tablet Take 1 tablet (500 mg) by mouth once daily. 30 tablet 11    topiramate (Topamax) 100 mg tablet Take 1 tablet (100 mg) by mouth once daily at bedtime. 90 tablet 1    triamcinolone (Kenalog) 0.1 % cream Apply and rub in a thin film to affected areas twice daily (AM and PM)      melatonin 5 mg tablet Take 1 tablet (5 mg) by mouth once daily at bedtime. (Patient not taking: Reported on 7/29/2025) 30 tablet 11     No current facility-administered medications on file prior to visit.

## 2025-07-29 ENCOUNTER — APPOINTMENT (OUTPATIENT)
Dept: PHARMACY | Facility: HOSPITAL | Age: 80
End: 2025-07-29
Payer: MEDICARE

## 2025-07-29 DIAGNOSIS — E78.5 HYPERLIPIDEMIA, UNSPECIFIED HYPERLIPIDEMIA TYPE: Primary | ICD-10-CM

## 2025-07-29 NOTE — Clinical Note
Zeynep Hinds, Lipid panel from 8/2024 shows LDL at goal but increased triglycerides at 202 mg/dL. Recommended patient get lipid panel drawn prior to next follow up. Continue Praluent and rosuvastatin unchanged. PAP info sent to get renewal.

## 2025-07-29 NOTE — ASSESSMENT & PLAN NOTE
Lipid panel from 8/2024 shows LDL at goal but increased triglycerides at 202 mg/dL. Recommended patient get lipid panel drawn prior to next follow up. Continue Praluent and rosuvastatin unchanged

## 2025-07-30 LAB
CHOLEST SERPL-MCNC: 85 MG/DL
CHOLEST/HDLC SERPL: 1.5 (CALC)
HDLC SERPL-MCNC: 55 MG/DL
LDLC SERPL CALC-MCNC: 12 MG/DL (CALC)
NONHDLC SERPL-MCNC: 30 MG/DL (CALC)
TRIGL SERPL-MCNC: 98 MG/DL

## 2025-08-01 DIAGNOSIS — M51.27 HERNIATED NUCLEUS PULPOSUS, L5-S1, RIGHT: ICD-10-CM

## 2025-08-01 RX ORDER — OXYCODONE AND ACETAMINOPHEN 5; 325 MG/1; MG/1
1 TABLET ORAL 3 TIMES DAILY PRN
Qty: 30 TABLET | Refills: 0 | Status: SHIPPED | OUTPATIENT
Start: 2025-08-01 | End: 2025-08-11

## 2025-08-04 ENCOUNTER — TELEPHONE (OUTPATIENT)
Dept: PHARMACY | Facility: HOSPITAL | Age: 80
End: 2025-08-04
Payer: MEDICARE

## 2025-08-04 NOTE — TELEPHONE ENCOUNTER
Spoke with patient and his partner Adele regarding  PAP assistance. Unfortunately, at this time the patient does not qualify. I gave patient information to [employer] and recommended he apply to this and we can touch base at our scheduled appointment 8/19 @ 10AM.     Jack Barboza, PharmD

## 2025-08-04 NOTE — TELEPHONE ENCOUNTER
Called patient and left message for patient to call back. Patient called multiple times regarding sending PAP financial information and was calling the patient back to answer these questions.    Jack Barboza, PharmD

## 2025-08-18 ENCOUNTER — TELEPHONE (OUTPATIENT)
Dept: PHARMACY | Facility: CLINIC | Age: 80
End: 2025-08-18

## 2025-08-18 DIAGNOSIS — E78.5 HYPERLIPIDEMIA LDL GOAL <70: ICD-10-CM

## 2025-08-18 DIAGNOSIS — I25.10 ASCVD (ARTERIOSCLEROTIC CARDIOVASCULAR DISEASE): ICD-10-CM

## 2025-08-18 RX ORDER — ALIROCUMAB 150 MG/ML
INJECTION, SOLUTION SUBCUTANEOUS
Qty: 6 ML | Refills: 3 | Status: SHIPPED | OUTPATIENT
Start: 2025-08-18 | End: 2025-08-18

## 2025-08-18 RX ORDER — ALIROCUMAB 150 MG/ML
INJECTION, SOLUTION SUBCUTANEOUS
Qty: 6 ML | Refills: 3 | Status: SHIPPED | OUTPATIENT
Start: 2025-08-18 | End: 2026-08-17

## 2025-08-19 ENCOUNTER — APPOINTMENT (OUTPATIENT)
Dept: PHARMACY | Facility: HOSPITAL | Age: 80
End: 2025-08-19
Payer: MEDICARE

## 2025-08-19 DIAGNOSIS — E78.5 HYPERLIPIDEMIA, UNSPECIFIED HYPERLIPIDEMIA TYPE: ICD-10-CM

## 2025-08-26 ENCOUNTER — OFFICE VISIT (OUTPATIENT)
Dept: NEUROSURGERY | Facility: CLINIC | Age: 80
End: 2025-08-26
Payer: MEDICARE

## 2025-08-26 ENCOUNTER — HOSPITAL ENCOUNTER (OUTPATIENT)
Dept: RADIOLOGY | Facility: CLINIC | Age: 80
Discharge: HOME | End: 2025-08-26
Payer: MEDICARE

## 2025-08-26 VITALS
DIASTOLIC BLOOD PRESSURE: 85 MMHG | HEART RATE: 103 BPM | HEIGHT: 66 IN | BODY MASS INDEX: 29.73 KG/M2 | RESPIRATION RATE: 18 BRPM | SYSTOLIC BLOOD PRESSURE: 131 MMHG | WEIGHT: 185 LBS

## 2025-08-26 DIAGNOSIS — M47.817 SPONDYLOSIS OF LUMBOSACRAL REGION WITHOUT MYELOPATHY OR RADICULOPATHY: Primary | ICD-10-CM

## 2025-08-26 DIAGNOSIS — M43.16 SPONDYLOLISTHESIS OF LUMBAR REGION: ICD-10-CM

## 2025-08-26 DIAGNOSIS — M54.51 VERTEBROGENIC LOW BACK PAIN: ICD-10-CM

## 2025-08-26 PROCEDURE — 3079F DIAST BP 80-89 MM HG: CPT | Performed by: NEUROLOGICAL SURGERY

## 2025-08-26 PROCEDURE — 72110 X-RAY EXAM L-2 SPINE 4/>VWS: CPT | Performed by: STUDENT IN AN ORGANIZED HEALTH CARE EDUCATION/TRAINING PROGRAM

## 2025-08-26 PROCEDURE — 72110 X-RAY EXAM L-2 SPINE 4/>VWS: CPT

## 2025-08-26 PROCEDURE — 3075F SYST BP GE 130 - 139MM HG: CPT | Performed by: NEUROLOGICAL SURGERY

## 2025-08-26 PROCEDURE — 99214 OFFICE O/P EST MOD 30 MIN: CPT | Performed by: NEUROLOGICAL SURGERY

## 2025-08-26 PROCEDURE — 1125F AMNT PAIN NOTED PAIN PRSNT: CPT | Performed by: NEUROLOGICAL SURGERY

## 2025-08-26 ASSESSMENT — PAIN SCALES - GENERAL: PAINLEVEL_OUTOF10: 4

## 2025-09-03 ENCOUNTER — SPECIALTY PHARMACY (OUTPATIENT)
Dept: PHARMACY | Facility: CLINIC | Age: 80
End: 2025-09-03

## 2025-09-09 ENCOUNTER — APPOINTMENT (OUTPATIENT)
Facility: CLINIC | Age: 80
End: 2025-09-09
Payer: MEDICARE

## 2025-09-23 ENCOUNTER — APPOINTMENT (OUTPATIENT)
Dept: PHARMACY | Facility: HOSPITAL | Age: 80
End: 2025-09-23
Payer: MEDICARE

## 2025-09-29 ENCOUNTER — APPOINTMENT (OUTPATIENT)
Dept: PRIMARY CARE | Facility: CLINIC | Age: 80
End: 2025-09-29
Payer: MEDICARE